# Patient Record
Sex: MALE | Race: WHITE | Employment: OTHER | URBAN - NONMETROPOLITAN AREA
[De-identification: names, ages, dates, MRNs, and addresses within clinical notes are randomized per-mention and may not be internally consistent; named-entity substitution may affect disease eponyms.]

---

## 2019-07-10 ENCOUNTER — APPOINTMENT (OUTPATIENT)
Dept: ULTRASOUND IMAGING | Age: 79
End: 2019-07-10
Payer: MEDICARE

## 2019-07-10 ENCOUNTER — HOSPITAL ENCOUNTER (EMERGENCY)
Age: 79
Discharge: HOME | End: 2019-07-10
Attending: EMERGENCY MEDICINE
Payer: MEDICARE

## 2019-07-10 VITALS
SYSTOLIC BLOOD PRESSURE: 159 MMHG | TEMPERATURE: 97.6 F | RESPIRATION RATE: 16 BRPM | OXYGEN SATURATION: 98 % | BODY MASS INDEX: 23.52 KG/M2 | DIASTOLIC BLOOD PRESSURE: 81 MMHG | HEART RATE: 70 BPM | HEIGHT: 71 IN | WEIGHT: 168 LBS

## 2019-07-10 DIAGNOSIS — M79.89 LEG SWELLING: Primary | ICD-10-CM

## 2019-07-10 PROCEDURE — 93971 EXTREMITY STUDY: CPT

## 2019-07-10 PROCEDURE — 99283 EMERGENCY DEPT VISIT LOW MDM: CPT

## 2019-07-10 RX ORDER — HYDROCHLOROTHIAZIDE 25 MG/1
12.5 TABLET ORAL EVERY MORNING
Qty: 10 TABLET | Refills: 0 | Status: SHIPPED | OUTPATIENT
Start: 2019-07-10

## 2019-07-10 ASSESSMENT — PAIN DESCRIPTION - FREQUENCY: FREQUENCY: CONTINUOUS

## 2019-07-10 ASSESSMENT — PAIN SCALES - GENERAL: PAINLEVEL_OUTOF10: 1

## 2019-07-10 ASSESSMENT — PAIN DESCRIPTION - LOCATION: LOCATION: LEG

## 2019-07-10 ASSESSMENT — PAIN DESCRIPTION - PAIN TYPE: TYPE: ACUTE PAIN

## 2019-07-10 ASSESSMENT — PAIN DESCRIPTION - ORIENTATION: ORIENTATION: RIGHT;LOWER

## 2019-07-10 ASSESSMENT — PAIN DESCRIPTION - DESCRIPTORS: DESCRIPTORS: PRESSURE

## 2019-07-10 NOTE — ED NOTES
Compression stocking applied. Discharge instructions and Rx given. Voices understanding.       Chasity Black RN  07/10/19 2473

## 2019-07-10 NOTE — ED PROVIDER NOTES
Emergency Department Encounter  Location: [facility] [address] [place] [address]    Patient: Jasmina Garcia  MRN: [id number]  : 1940  Date of evaluation: 7/10/2019  ED Provider: Robert Palafox DO, FACEP    Chief Complaint:    Leg Swelling (right lower leg swelling x 2 days. Sent from Urgent Care. pt states (I'm worried about polio. You see it on tv alot). )    [place]:  Jasmina Garcia is a 78 y.o. male that presents to the emergency department from the urgent care with complaints of swelling in his right leg for the past 2 days. The patient states that his leg was much more swollen yesterday. He states he try to go to his doctor today. His doctor's office sent him to the urgent care. The urgent care sent him to the emergency department. Patient states he has had some cramping in his foot and in his lower extremity that has occurred during the night. He is denying any pain at this time. He states occasionally he will have pain on the medial surface of his right lower leg. He has no pain above his knee. He is complaining of slight pain in the anterior portion of his knee as well. He has had no specific injury. He denies any previous history of leg swelling. He is concerned about a return of polio. He states he had polio when he was 3years old. He denies any numbness or tingling or any weakness of the extremity. ROS:  At least 4 systems reviewed and otherwise acutely negative except as in the [address] Past Medical History:   Diagnosis Date    Diverticulitis     Hyperlipidemia     Pneumothorax, right     Rectal bleeding     Seasonal allergic reaction      Past Surgical History:   Procedure Laterality Date    CHEST TUBE INSERTION      TONSILLECTOMY      as child     Family History   Problem Relation Age of Onset    Heart Disease Mother     Cancer Mother     Diabetes Father     Diabetes Brother     Cancer Sister      Social History     Socioeconomic History    Marital status:

## 2021-12-16 ENCOUNTER — HOSPITAL ENCOUNTER (EMERGENCY)
Age: 81
Discharge: ANOTHER HOSPITAL | End: 2021-12-17
Attending: EMERGENCY MEDICINE
Payer: MEDICARE

## 2021-12-16 ENCOUNTER — APPOINTMENT (OUTPATIENT)
Dept: GENERAL RADIOLOGY | Age: 81
End: 2021-12-16
Payer: MEDICARE

## 2021-12-16 ENCOUNTER — APPOINTMENT (OUTPATIENT)
Dept: CT IMAGING | Age: 81
End: 2021-12-16
Payer: MEDICARE

## 2021-12-16 DIAGNOSIS — K57.30 COLON, DIVERTICULOSIS: ICD-10-CM

## 2021-12-16 DIAGNOSIS — K80.00 ACUTE CHOLECYSTITIS DUE TO BILIARY CALCULUS: Primary | ICD-10-CM

## 2021-12-16 DIAGNOSIS — R79.89 ELEVATED LFTS: ICD-10-CM

## 2021-12-16 LAB
ALBUMIN SERPL-MCNC: 4.6 GM/DL (ref 3.4–5)
ALP BLD-CCNC: 126 IU/L (ref 40–129)
ALT SERPL-CCNC: 344 U/L (ref 10–40)
ANION GAP SERPL CALCULATED.3IONS-SCNC: 15 MMOL/L (ref 4–16)
AST SERPL-CCNC: 452 IU/L (ref 15–37)
BASOPHILS ABSOLUTE: 0 K/CU MM
BASOPHILS RELATIVE PERCENT: 0.1 % (ref 0–1)
BILIRUB SERPL-MCNC: 2.4 MG/DL (ref 0–1)
BUN BLDV-MCNC: 18 MG/DL (ref 6–23)
CALCIUM SERPL-MCNC: 9.4 MG/DL (ref 8.3–10.6)
CHLORIDE BLD-SCNC: 102 MMOL/L (ref 99–110)
CO2: 21 MMOL/L (ref 21–32)
CREAT SERPL-MCNC: 1.1 MG/DL (ref 0.9–1.3)
DIFFERENTIAL TYPE: ABNORMAL
EOSINOPHILS ABSOLUTE: 0 K/CU MM
EOSINOPHILS RELATIVE PERCENT: 0 % (ref 0–3)
GFR AFRICAN AMERICAN: >60 ML/MIN/1.73M2
GFR NON-AFRICAN AMERICAN: >60 ML/MIN/1.73M2
GLUCOSE BLD-MCNC: 200 MG/DL (ref 70–99)
HCT VFR BLD CALC: 44.4 % (ref 42–52)
HEMOGLOBIN: 14.6 GM/DL (ref 13.5–18)
IMMATURE NEUTROPHIL %: 0.9 % (ref 0–0.43)
LYMPHOCYTES ABSOLUTE: 0.3 K/CU MM
LYMPHOCYTES RELATIVE PERCENT: 3.3 % (ref 24–44)
MCH RBC QN AUTO: 32.4 PG (ref 27–31)
MCHC RBC AUTO-ENTMCNC: 32.9 % (ref 32–36)
MCV RBC AUTO: 98.4 FL (ref 78–100)
MONOCYTES ABSOLUTE: 0.6 K/CU MM
MONOCYTES RELATIVE PERCENT: 6.5 % (ref 0–4)
PDW BLD-RTO: 12.4 % (ref 11.7–14.9)
PLATELET # BLD: 192 K/CU MM (ref 140–440)
PMV BLD AUTO: 10.9 FL (ref 7.5–11.1)
POTASSIUM SERPL-SCNC: 4.3 MMOL/L (ref 3.5–5.1)
RBC # BLD: 4.51 M/CU MM (ref 4.6–6.2)
SEGMENTED NEUTROPHILS ABSOLUTE COUNT: 8.5 K/CU MM
SEGMENTED NEUTROPHILS RELATIVE PERCENT: 89.2 % (ref 36–66)
SODIUM BLD-SCNC: 138 MMOL/L (ref 135–145)
TOTAL IMMATURE NEUTOROPHIL: 0.09 K/CU MM
TOTAL PROTEIN: 8 GM/DL (ref 6.4–8.2)
TROPONIN T: <0.01 NG/ML
WBC # BLD: 9.6 K/CU MM (ref 4–10.5)

## 2021-12-16 PROCEDURE — 99285 EMERGENCY DEPT VISIT HI MDM: CPT

## 2021-12-16 PROCEDURE — 83690 ASSAY OF LIPASE: CPT

## 2021-12-16 PROCEDURE — 71045 X-RAY EXAM CHEST 1 VIEW: CPT

## 2021-12-16 PROCEDURE — 84484 ASSAY OF TROPONIN QUANT: CPT

## 2021-12-16 PROCEDURE — 74177 CT ABD & PELVIS W/CONTRAST: CPT

## 2021-12-16 PROCEDURE — 96365 THER/PROPH/DIAG IV INF INIT: CPT

## 2021-12-16 PROCEDURE — 6360000004 HC RX CONTRAST MEDICATION: Performed by: EMERGENCY MEDICINE

## 2021-12-16 PROCEDURE — 85025 COMPLETE CBC W/AUTO DIFF WBC: CPT

## 2021-12-16 PROCEDURE — 6370000000 HC RX 637 (ALT 250 FOR IP): Performed by: EMERGENCY MEDICINE

## 2021-12-16 PROCEDURE — 96375 TX/PRO/DX INJ NEW DRUG ADDON: CPT

## 2021-12-16 PROCEDURE — 80053 COMPREHEN METABOLIC PANEL: CPT

## 2021-12-16 PROCEDURE — 80074 ACUTE HEPATITIS PANEL: CPT

## 2021-12-16 RX ORDER — SUCRALFATE 1 G/1
1 TABLET ORAL ONCE
Status: COMPLETED | OUTPATIENT
Start: 2021-12-16 | End: 2021-12-16

## 2021-12-16 RX ORDER — FAMOTIDINE 20 MG/1
20 TABLET, FILM COATED ORAL ONCE
Status: COMPLETED | OUTPATIENT
Start: 2021-12-16 | End: 2021-12-16

## 2021-12-16 RX ORDER — ASPIRIN 81 MG/1
324 TABLET, CHEWABLE ORAL ONCE
Status: COMPLETED | OUTPATIENT
Start: 2021-12-16 | End: 2021-12-16

## 2021-12-16 RX ADMIN — ASPIRIN 81 MG CHEWABLE TABLET 324 MG: 81 TABLET CHEWABLE at 22:44

## 2021-12-16 RX ADMIN — SUCRALFATE 1 G: 1 TABLET ORAL at 22:44

## 2021-12-16 RX ADMIN — IOPAMIDOL 100 ML: 755 INJECTION, SOLUTION INTRAVENOUS at 23:05

## 2021-12-16 RX ADMIN — FAMOTIDINE 20 MG: 20 TABLET, FILM COATED ORAL at 22:44

## 2021-12-17 ENCOUNTER — HOSPITAL ENCOUNTER (INPATIENT)
Age: 81
LOS: 1 days | Discharge: HOME | DRG: 419 | End: 2021-12-18
Attending: STUDENT IN AN ORGANIZED HEALTH CARE EDUCATION/TRAINING PROGRAM | Admitting: INTERNAL MEDICINE
Payer: MEDICARE

## 2021-12-17 ENCOUNTER — ANESTHESIA EVENT (OUTPATIENT)
Dept: OPERATING ROOM | Age: 81
DRG: 419 | End: 2021-12-17
Payer: MEDICARE

## 2021-12-17 ENCOUNTER — APPOINTMENT (OUTPATIENT)
Dept: ULTRASOUND IMAGING | Age: 81
End: 2021-12-17
Payer: MEDICARE

## 2021-12-17 VITALS
HEART RATE: 59 BPM | WEIGHT: 168 LBS | SYSTOLIC BLOOD PRESSURE: 143 MMHG | RESPIRATION RATE: 11 BRPM | DIASTOLIC BLOOD PRESSURE: 72 MMHG | HEIGHT: 71 IN | BODY MASS INDEX: 23.52 KG/M2 | OXYGEN SATURATION: 99 % | TEMPERATURE: 98.2 F

## 2021-12-17 DIAGNOSIS — K81.0 ACUTE CHOLECYSTITIS: Primary | ICD-10-CM

## 2021-12-17 PROBLEM — K80.20 CHOLELITHIASIS: Status: ACTIVE | Noted: 2021-12-17

## 2021-12-17 PROBLEM — E78.5 HLD (HYPERLIPIDEMIA): Status: ACTIVE | Noted: 2021-12-17

## 2021-12-17 PROBLEM — R10.13 EPIGASTRIC ABDOMINAL PAIN: Status: ACTIVE | Noted: 2021-12-17

## 2021-12-17 PROBLEM — I10 ESSENTIAL HYPERTENSION: Status: ACTIVE | Noted: 2021-12-17

## 2021-12-17 LAB
ALBUMIN SERPL-MCNC: 3.5 GM/DL (ref 3.4–5)
ALP BLD-CCNC: 110 IU/L (ref 40–129)
ALT SERPL-CCNC: 357 U/L (ref 10–40)
ANION GAP SERPL CALCULATED.3IONS-SCNC: 6 MMOL/L (ref 4–16)
AST SERPL-CCNC: 328 IU/L (ref 15–37)
BASOPHILS ABSOLUTE: 0 K/CU MM
BASOPHILS RELATIVE PERCENT: 0.3 % (ref 0–1)
BILIRUB SERPL-MCNC: 2.7 MG/DL (ref 0–1)
BUN BLDV-MCNC: 13 MG/DL (ref 6–23)
CALCIUM SERPL-MCNC: 8.6 MG/DL (ref 8.3–10.6)
CHLORIDE BLD-SCNC: 104 MMOL/L (ref 99–110)
CO2: 28 MMOL/L (ref 21–32)
CREAT SERPL-MCNC: 1.1 MG/DL (ref 0.9–1.3)
DIFFERENTIAL TYPE: ABNORMAL
EKG ATRIAL RATE: 98 BPM
EKG DIAGNOSIS: NORMAL
EKG P AXIS: 70 DEGREES
EKG P-R INTERVAL: 158 MS
EKG Q-T INTERVAL: 368 MS
EKG QRS DURATION: 140 MS
EKG QTC CALCULATION (BAZETT): 469 MS
EKG R AXIS: -40 DEGREES
EKG T AXIS: 14 DEGREES
EKG VENTRICULAR RATE: 98 BPM
EOSINOPHILS ABSOLUTE: 0 K/CU MM
EOSINOPHILS RELATIVE PERCENT: 0.3 % (ref 0–3)
ESTIMATED AVERAGE GLUCOSE: 117 MG/DL
GFR AFRICAN AMERICAN: >60 ML/MIN/1.73M2
GFR NON-AFRICAN AMERICAN: >60 ML/MIN/1.73M2
GLUCOSE BLD-MCNC: 115 MG/DL (ref 70–99)
HAV IGM SER IA-ACNC: NON REACTIVE
HBA1C MFR BLD: 5.7 % (ref 4.2–6.3)
HCT VFR BLD CALC: 39.8 % (ref 42–52)
HEMOGLOBIN: 13.2 GM/DL (ref 13.5–18)
HEPATITIS B CORE IGM ANTIBODY: NON REACTIVE
HEPATITIS B SURFACE ANTIGEN: NON REACTIVE
HEPATITIS C ANTIBODY: NON REACTIVE
IMMATURE NEUTROPHIL %: 0.3 % (ref 0–0.43)
INR BLD: 1.09 INDEX
LIPASE: 17 IU/L (ref 13–60)
LYMPHOCYTES ABSOLUTE: 0.7 K/CU MM
LYMPHOCYTES RELATIVE PERCENT: 10.6 % (ref 24–44)
MCH RBC QN AUTO: 32.4 PG (ref 27–31)
MCHC RBC AUTO-ENTMCNC: 33.2 % (ref 32–36)
MCV RBC AUTO: 97.5 FL (ref 78–100)
MONOCYTES ABSOLUTE: 0.9 K/CU MM
MONOCYTES RELATIVE PERCENT: 12.4 % (ref 0–4)
PDW BLD-RTO: 12.5 % (ref 11.7–14.9)
PLATELET # BLD: 165 K/CU MM (ref 140–440)
PMV BLD AUTO: 10.2 FL (ref 7.5–11.1)
POTASSIUM SERPL-SCNC: 3.8 MMOL/L (ref 3.5–5.1)
PROTHROMBIN TIME: 13.5 SECONDS (ref 11.7–14.5)
RBC # BLD: 4.08 M/CU MM (ref 4.6–6.2)
SARS-COV-2, NAAT: NOT DETECTED
SEGMENTED NEUTROPHILS ABSOLUTE COUNT: 5.2 K/CU MM
SEGMENTED NEUTROPHILS RELATIVE PERCENT: 76.1 % (ref 36–66)
SODIUM BLD-SCNC: 138 MMOL/L (ref 135–145)
SOURCE: NORMAL
TOTAL IMMATURE NEUTOROPHIL: 0.02 K/CU MM
TOTAL PROTEIN: 6.3 GM/DL (ref 6.4–8.2)
WBC # BLD: 6.9 K/CU MM (ref 4–10.5)

## 2021-12-17 PROCEDURE — 6360000002 HC RX W HCPCS: Performed by: PHYSICIAN ASSISTANT

## 2021-12-17 PROCEDURE — 96375 TX/PRO/DX INJ NEW DRUG ADDON: CPT

## 2021-12-17 PROCEDURE — 99222 1ST HOSP IP/OBS MODERATE 55: CPT | Performed by: SURGERY

## 2021-12-17 PROCEDURE — 83036 HEMOGLOBIN GLYCOSYLATED A1C: CPT

## 2021-12-17 PROCEDURE — C9113 INJ PANTOPRAZOLE SODIUM, VIA: HCPCS | Performed by: PHYSICIAN ASSISTANT

## 2021-12-17 PROCEDURE — 96365 THER/PROPH/DIAG IV INF INIT: CPT

## 2021-12-17 PROCEDURE — 85610 PROTHROMBIN TIME: CPT

## 2021-12-17 PROCEDURE — 85025 COMPLETE CBC W/AUTO DIFF WBC: CPT

## 2021-12-17 PROCEDURE — 87635 SARS-COV-2 COVID-19 AMP PRB: CPT

## 2021-12-17 PROCEDURE — 96372 THER/PROPH/DIAG INJ SC/IM: CPT

## 2021-12-17 PROCEDURE — 1200000000 HC SEMI PRIVATE

## 2021-12-17 PROCEDURE — 76705 ECHO EXAM OF ABDOMEN: CPT

## 2021-12-17 PROCEDURE — 2580000003 HC RX 258: Performed by: INTERNAL MEDICINE

## 2021-12-17 PROCEDURE — 2580000003 HC RX 258: Performed by: EMERGENCY MEDICINE

## 2021-12-17 PROCEDURE — 6360000002 HC RX W HCPCS: Performed by: INTERNAL MEDICINE

## 2021-12-17 PROCEDURE — 96366 THER/PROPH/DIAG IV INF ADDON: CPT

## 2021-12-17 PROCEDURE — 6370000000 HC RX 637 (ALT 250 FOR IP): Performed by: INTERNAL MEDICINE

## 2021-12-17 PROCEDURE — 6360000002 HC RX W HCPCS: Performed by: EMERGENCY MEDICINE

## 2021-12-17 PROCEDURE — 80053 COMPREHEN METABOLIC PANEL: CPT

## 2021-12-17 RX ORDER — ASPIRIN 81 MG/1
81 TABLET ORAL DAILY
Status: DISCONTINUED | OUTPATIENT
Start: 2021-12-17 | End: 2021-12-18 | Stop reason: HOSPADM

## 2021-12-17 RX ORDER — ATORVASTATIN CALCIUM 10 MG/1
10 TABLET, FILM COATED ORAL NIGHTLY
Status: DISCONTINUED | OUTPATIENT
Start: 2021-12-17 | End: 2021-12-18 | Stop reason: HOSPADM

## 2021-12-17 RX ORDER — SODIUM CHLORIDE 9 MG/ML
INJECTION, SOLUTION INTRAVENOUS CONTINUOUS
Status: DISCONTINUED | OUTPATIENT
Start: 2021-12-17 | End: 2021-12-18 | Stop reason: HOSPADM

## 2021-12-17 RX ORDER — POLYETHYLENE GLYCOL 3350 17 G/17G
17 POWDER, FOR SOLUTION ORAL DAILY PRN
Status: DISCONTINUED | OUTPATIENT
Start: 2021-12-17 | End: 2021-12-18 | Stop reason: HOSPADM

## 2021-12-17 RX ORDER — SODIUM CHLORIDE 9 MG/ML
INJECTION, SOLUTION INTRAVENOUS CONTINUOUS
Status: DISCONTINUED | OUTPATIENT
Start: 2021-12-17 | End: 2021-12-17 | Stop reason: HOSPADM

## 2021-12-17 RX ORDER — ONDANSETRON 4 MG/1
4 TABLET, ORALLY DISINTEGRATING ORAL EVERY 8 HOURS PRN
Status: DISCONTINUED | OUTPATIENT
Start: 2021-12-17 | End: 2021-12-18 | Stop reason: HOSPADM

## 2021-12-17 RX ORDER — SODIUM CHLORIDE 9 MG/ML
1000 INJECTION, SOLUTION INTRAVENOUS CONTINUOUS
Status: DISCONTINUED | OUTPATIENT
Start: 2021-12-17 | End: 2021-12-17 | Stop reason: HOSPADM

## 2021-12-17 RX ORDER — ACETAMINOPHEN 650 MG/1
650 SUPPOSITORY RECTAL EVERY 6 HOURS PRN
Status: DISCONTINUED | OUTPATIENT
Start: 2021-12-17 | End: 2021-12-18 | Stop reason: HOSPADM

## 2021-12-17 RX ORDER — SODIUM CHLORIDE 0.9 % (FLUSH) 0.9 %
5-40 SYRINGE (ML) INJECTION EVERY 12 HOURS SCHEDULED
Status: DISCONTINUED | OUTPATIENT
Start: 2021-12-17 | End: 2021-12-18 | Stop reason: HOSPADM

## 2021-12-17 RX ORDER — SODIUM CHLORIDE 9 MG/ML
25 INJECTION, SOLUTION INTRAVENOUS PRN
Status: DISCONTINUED | OUTPATIENT
Start: 2021-12-17 | End: 2021-12-18 | Stop reason: HOSPADM

## 2021-12-17 RX ORDER — MORPHINE SULFATE 2 MG/ML
2 INJECTION, SOLUTION INTRAMUSCULAR; INTRAVENOUS EVERY 4 HOURS PRN
Status: DISCONTINUED | OUTPATIENT
Start: 2021-12-17 | End: 2021-12-17 | Stop reason: HOSPADM

## 2021-12-17 RX ORDER — ACETAMINOPHEN 325 MG/1
650 TABLET ORAL EVERY 6 HOURS PRN
Status: DISCONTINUED | OUTPATIENT
Start: 2021-12-17 | End: 2021-12-18 | Stop reason: HOSPADM

## 2021-12-17 RX ORDER — FLUTICASONE PROPIONATE 50 MCG
1 SPRAY, SUSPENSION (ML) NASAL DAILY
Status: DISCONTINUED | OUTPATIENT
Start: 2021-12-17 | End: 2021-12-18 | Stop reason: HOSPADM

## 2021-12-17 RX ORDER — MORPHINE SULFATE 2 MG/ML
2 INJECTION, SOLUTION INTRAMUSCULAR; INTRAVENOUS EVERY 4 HOURS PRN
Status: DISCONTINUED | OUTPATIENT
Start: 2021-12-17 | End: 2021-12-18 | Stop reason: HOSPADM

## 2021-12-17 RX ORDER — SODIUM CHLORIDE 0.9 % (FLUSH) 0.9 %
5-40 SYRINGE (ML) INJECTION PRN
Status: DISCONTINUED | OUTPATIENT
Start: 2021-12-17 | End: 2021-12-18 | Stop reason: HOSPADM

## 2021-12-17 RX ORDER — ONDANSETRON 2 MG/ML
4 INJECTION INTRAMUSCULAR; INTRAVENOUS EVERY 6 HOURS PRN
Status: DISCONTINUED | OUTPATIENT
Start: 2021-12-17 | End: 2021-12-18 | Stop reason: HOSPADM

## 2021-12-17 RX ORDER — PANTOPRAZOLE SODIUM 40 MG/10ML
40 INJECTION, POWDER, LYOPHILIZED, FOR SOLUTION INTRAVENOUS DAILY
Status: DISCONTINUED | OUTPATIENT
Start: 2021-12-17 | End: 2021-12-17 | Stop reason: HOSPADM

## 2021-12-17 RX ORDER — HYDROCODONE BITARTRATE AND ACETAMINOPHEN 5; 325 MG/1; MG/1
1 TABLET ORAL EVERY 6 HOURS PRN
Status: DISCONTINUED | OUTPATIENT
Start: 2021-12-17 | End: 2021-12-18 | Stop reason: HOSPADM

## 2021-12-17 RX ADMIN — SODIUM CHLORIDE 1000 ML: 9 INJECTION, SOLUTION INTRAVENOUS at 01:43

## 2021-12-17 RX ADMIN — ATORVASTATIN CALCIUM 10 MG: 10 TABLET, FILM COATED ORAL at 22:20

## 2021-12-17 RX ADMIN — PANTOPRAZOLE SODIUM 40 MG: 40 INJECTION, POWDER, FOR SOLUTION INTRAVENOUS at 07:55

## 2021-12-17 RX ADMIN — SODIUM CHLORIDE: 9 INJECTION, SOLUTION INTRAVENOUS at 15:22

## 2021-12-17 RX ADMIN — SODIUM CHLORIDE, PRESERVATIVE FREE 10 ML: 5 INJECTION INTRAVENOUS at 22:28

## 2021-12-17 RX ADMIN — ENOXAPARIN SODIUM 40 MG: 100 INJECTION SUBCUTANEOUS at 15:31

## 2021-12-17 RX ADMIN — ASPIRIN 81 MG: 81 TABLET, COATED ORAL at 15:30

## 2021-12-17 RX ADMIN — CEFTRIAXONE SODIUM 1000 MG: 1 INJECTION, POWDER, FOR SOLUTION INTRAMUSCULAR; INTRAVENOUS at 07:10

## 2021-12-17 RX ADMIN — PIPERACILLIN AND TAZOBACTAM 3375 MG: 3; .375 INJECTION, POWDER, LYOPHILIZED, FOR SOLUTION INTRAVENOUS at 15:30

## 2021-12-17 RX ADMIN — PIPERACILLIN AND TAZOBACTAM 3375 MG: 3; .375 INJECTION, POWDER, LYOPHILIZED, FOR SOLUTION INTRAVENOUS at 22:24

## 2021-12-17 NOTE — ED NOTES
Ambulated to BR with assistance. Pt ambulates with a limp and favors his Lt side.       Go Tierney RN  12/17/21 0311

## 2021-12-17 NOTE — PROGRESS NOTES
Pt direct admit from [facility]. Transferred d to bed without incident. Alert and oriented x4. Orientation provided to patient about use of call light, telephone. Teachings provided about safety and infection control. Pt verbalizes understanding. Call light and low bed in position. Skin assessment completed with no skin issues noted.  Skin WDL

## 2021-12-17 NOTE — ED TRIAGE NOTES
At lunch noticed a bump to lower chest upper abd. Was having some burning up into chest. Vomited twice.

## 2021-12-17 NOTE — ED NOTES
No complaints. Watching TV. Wants to go home. Feels better and denies epigastric pain. Two yellow tops drawn for hepatitis screen  From existing IV site after 10 10ml of blood discarded.        Ignacio Vicente RN  12/17/21 2051

## 2021-12-17 NOTE — CONSULTS
Hospital Medicine History & Physical      PCP: Jessica Aviles MD    Date of Admission: 12/16/2021    Date of Service: Pt seen/examined on 12/17/2021    Chief Complaint:    Chief Complaint   Patient presents with    Chest Pain         History Of Present Illness: The patient is a 80 y.o. male with anemia, history of pneumothorax who presented to [facility] ED with complaint of chest pain and abdominal pain. Patient states it started yesterday around 1 to 2:00 in the afternoon. That was more of a gas pain abated to see if it would improve with time. He states that he started having worsening pain and had a few episodes of nonbloody, nonbilious emesis which prompted him to come to the emergency department. He states he had pain to the middle of his chest but no associated shortness of breath. He denies any associated fevers or chills. He denies any prior history of this. Currently reports that his pain is completely resolved. Past Medical History:        Diagnosis Date    Diverticulitis     Hyperlipidemia     Pneumothorax, right     Rectal bleeding     Seasonal allergic reaction        Past Surgical History:        Procedure Laterality Date    CHEST TUBE INSERTION      TONSILLECTOMY      as child       Medications Prior to Admission:    Prior to Admission medications    Medication Sig Start Date End Date Taking? Authorizing Provider   hydrochlorothiazide (HYDRODIURIL) 25 MG tablet Take 0.5 tablets by mouth every morning 7/10/19  Yes Anusha Corrales DO   lovastatin (MEVACOR) 40 MG tablet Take 40 mg by mouth nightly. Yes Historical Provider, MD   fluticasone (FLONASE) 50 MCG/ACT nasal spray 1 spray by Nasal route daily. Yes Historical Provider, MD   aspirin 81 MG tablet Take 81 mg by mouth daily. Yes Historical Provider, MD   Cyanocobalamin (VITAMIN B 12 PO) Take  by mouth daily.    Yes Historical Provider, MD       Allergies:  Sulfa antibiotics    Social History:  The patient currently lives at home    TOBACCO:   reports that he has never smoked. He has never used smokeless tobacco.  ETOH:   reports no history of alcohol use. Family History:   Positive as follows:        Problem Relation Age of Onset    Heart Disease Mother     Cancer Mother     Diabetes Father     Diabetes Brother     Cancer Sister        REVIEW OF SYSTEMS:       Constitutional: Negative for fever   HENT: Negative for sore throat   Eyes: Negative for redness   Respiratory: Negative  for dyspnea, cough   Cardiovascular: + Chest pain   Gastrointestinal: + Abdominal pain + nausea and vomiting  Genitourinary: Negative for hematuria   Musculoskeletal: Negative for arthralgias   Skin: Negative for rash   Neurological: Negative for syncope   Hematological: Negative for adenopathy   Psychiatric/Behavorial: Negative for anxiety    PHYSICAL EXAM:    /71   Pulse 61   Temp 98.6 °F (37 °C) (Oral)   Resp 13   Ht 5' 11\" (1.803 m)   Wt 168 lb (76.2 kg)   SpO2 97%   BMI 23.43 kg/m²     Gen: Elderly male, no distress. Alert. Eyes:  No sclera icterus. No conjunctival injection. ENT: No discharge. Pharynx clear. Neck: Trachea midline. Resp: No accessory muscle use. No crackles. No wheezes. No rhonchi. CV: Regular rate. Regular rhythm. No murmur. No rub. No edema. Capillary Refill: Brisk,< 3 seconds   Peripheral Pulses: +2 palpable, equal bilaterally   GI: Non-tender. Non-distended. Normal bowel sounds. No hernia. Skin: Warm and dry. M/S: No cyanosis. No joint deformity. No clubbing. Neuro: Awake. Grossly nonfocal    Psych: Oriented x 3. No anxiety or agitation.      CBC:   Recent Labs     12/16/21 2130   WBC 9.6   HGB 14.6   HCT 44.4   MCV 98.4        BMP:   Recent Labs     12/16/21 2130      K 4.3      CO2 21   BUN 18   CREATININE 1.1     LIVER PROFILE:   Recent Labs     12/16/21 2130 12/16/21  2245   *  --    *  --    LIPASE  --  17   BILITOT 2.4*  --    ALKPHOS 126  --

## 2021-12-17 NOTE — ED NOTES
Resting in bed quietly. Lights are off. Has not offered any complaints or needs.       Alpesh Mckeon RN  12/17/21 9709

## 2021-12-17 NOTE — CONSULTS
Department of General Surgery   Surgical Service Dr. Shazia Nicolas   Consult Note    Date of Consult: 12/17/21    Reason for Consult:  cholecystitis  Requesting Physician:  Dr. Nirmal Montenegro:  Abdominal pain, nausea    History Obtained From:  patient, electronic medical record    HISTORY OF PRESENT ILLNESS:    The patient is a 80 y.o. male who presented yesterday to the [place] ER with RUQ abdominal pain, nausea and emesis. Symptoms have since improved. He denies prior episodes. Denies changes in bowel habits. CT and US have been performed suggesting gallstones and emphysematous cholecystitis.     Past Medical History:    Past Medical History:   Diagnosis Date    Diverticulitis     Hyperlipidemia     Pneumothorax, right     Rectal bleeding     Seasonal allergic reaction        Past Surgical History:    Past Surgical History:   Procedure Laterality Date    CHEST TUBE INSERTION      TONSILLECTOMY      as child       Current Medications:   Current Facility-Administered Medications   Medication Dose Route Frequency Provider Last Rate Last Admin    aspirin EC tablet 81 mg  81 mg Oral Daily Awilda Edmond MD   81 mg at 12/17/21 1530    fluticasone (FLONASE) 50 MCG/ACT nasal spray 1 spray  1 spray Nasal Daily Awilda Edmond MD        atorvastatin (LIPITOR) tablet 10 mg  10 mg Oral Nightly Awilda Edmond MD        piperacillin-tazobactam (ZOSYN) 3,375 mg in dextrose 5 % 50 mL IVPB extended infusion (mini-bag)  3,375 mg IntraVENous Q8H Awilda Edmond MD 12.5 mL/hr at 12/17/21 1530 3,375 mg at 12/17/21 1530    0.9 % sodium chloride infusion   IntraVENous Continuous Awilda Edmond MD 75 mL/hr at 12/17/21 1522 New Bag at 12/17/21 1522    morphine (PF) injection 2 mg  2 mg IntraVENous Q4H PRN Awilda Edmond MD        sodium chloride flush 0.9 % injection 5-40 mL  5-40 mL IntraVENous 2 times per day Veronica Hyde MD        sodium chloride flush 0.9 % injection 5-40 mL  5-40 mL IntraVENous PRN Awilda Edmond MD        0.9 % sodium chloride infusion  25 mL IntraVENous PRN Awilda Edmond MD        enoxaparin (LOVENOX) injection 40 mg  40 mg SubCUTAneous Daily Awilda Edmond MD   40 mg at 12/17/21 1531    ondansetron (ZOFRAN-ODT) disintegrating tablet 4 mg  4 mg Oral Q8H PRN Awilda Edmond MD        Or    ondansetron (ZOFRAN) injection 4 mg  4 mg IntraVENous Q6H PRN Awilda Edmond MD        polyethylene glycol (GLYCOLAX) packet 17 g  17 g Oral Daily PRN Awilda Edmond MD        acetaminophen (TYLENOL) tablet 650 mg  650 mg Oral Q6H PRN Awilda Edmond MD        Or    acetaminophen (TYLENOL) suppository 650 mg  650 mg Rectal Q6H PRN Awilda Edmond MD        HYDROcodone-acetaminophen (NORCO) 5-325 MG per tablet 1 tablet  1 tablet Oral Q6H PRN Awilda Edmond MD           Allergies:  Sulfa antibiotics    Social History:   Social History     Socioeconomic History    Marital status:      Spouse name: Not on file    Number of children: Not on file    Years of education: Not on file    Highest education level: Not on file   Occupational History    Not on file   Tobacco Use    Smoking status: Never Smoker    Smokeless tobacco: Never Used   Vaping Use    Vaping Use: Never used   Substance and Sexual Activity    Alcohol use: No    Drug use: No    Sexual activity: Not Currently   Other Topics Concern    Not on file   Social History Narrative    Not on file     Social Determinants of Health     Financial Resource Strain:     Difficulty of Paying Living Expenses: Not on file   Food Insecurity:     Worried About Running Out of Food in the Last Year: Not on file    Zainab of Food in the Last Year: Not on file   Transportation Needs:     Lack of Transportation (Medical): Not on file    Lack of Transportation (Non-Medical):  Not on file   Physical Activity:     Days of Exercise per Week: Not on file    Minutes of Exercise per Session: Not on file   Stress:     Feeling of Stress : Not on file   Social Connections:     Frequency of Communication with Friends and Family: Not on file    Frequency of Social Gatherings with Friends and Family: Not on file    Attends Adventism Services: Not on file    Active Member of Clubs or Organizations: Not on file    Attends Club or Organization Meetings: Not on file    Marital Status: Not on file   Intimate Partner Violence:     Fear of Current or Ex-Partner: Not on file    Emotionally Abused: Not on file    Physically Abused: Not on file    Sexually Abused: Not on file   Housing Stability:     Unable to Pay for Housing in the Last Year: Not on file    Number of [place] in the Last Year: Not on file    Unstable Housing in the Last Year: Not on file       Family History:   Family History   Problem Relation Age of Onset    Heart Disease Mother     Cancer Mother     Diabetes Father     Diabetes Brother     Cancer Sister        REVIEW OFSYSTEMS:    Review of Systems   Constitutional: Negative for chills. Negative for fever. HENT: Negative for congestion. Negative for rhinorrhea. Respiratory: Negative for cough. Negative for shortness of breath. Negative for wheezing. Cardiovascular: Negative for chest pain. Gastrointestinal:  As per HPI  Genitourinary: Negative for difficulty urinating. Neurological: Negative for dizziness, syncope and numbness. Hematological: Does not bruise/bleed easily.        PHYSICAL EXAM:  Vitals:    12/17/21 1245 12/17/21 1317 12/17/21 1532   BP:  (!) 142/73 (!) 145/90   Pulse:  62 68   Resp:  16 19   Temp:  98.7 °F (37.1 °C) 98.2 °F (36.8 °C)   TempSrc:  Oral Oral   SpO2:   98%   Weight: 156 lb (70.8 kg)     Height: 5' 11\" (1.803 m)         Physical Exam  General: awake, alert, in no acute distress  HEENT: mucous membranes moist  Respiratory: normal effort, no wheezes appreciated  CV: appears well perfused, regular rate and rhythm  Abdomen: Soft, non-tender, non-distended. No guarding or rebound tenderness. Skin: warm and dry  Extremities: atraumatic  Neuro: no focal deficits noted  Psych: mood normal        DATA:    Lab Results   Component Value Date    WBC 6.9 12/17/2021    HGB 13.2 (L) 12/17/2021    HCT 39.8 (L) 12/17/2021    MCV 97.5 12/17/2021     12/17/2021     Lab Results   Component Value Date     12/17/2021    K 3.8 12/17/2021     12/17/2021    CO2 28 12/17/2021    BUN 13 12/17/2021    CREATININE 1.1 12/17/2021    GLUCOSE 115 12/17/2021    CALCIUM 8.6 12/17/2021          IMPRESSION:    80 y.o. male with cholecystitis. LFTs elevated. Pain has improved. Patient Active Problem List:     Bright red rectal bleeding     Diverticulosis     Acute cholecystitis     Cholelithiasis     Epigastric abdominal pain     HLD (hyperlipidemia)     Essential hypertension        PLAN:  - recommend laparoscopic cholecystectomy, will plan this tomorrow  - check LFTs in the AM    Risks and benefits of cholecystectomy were discussed.            Electronically signed by Violet Obrien MD on 12/17/2021 at 4:44 PM

## 2021-12-17 NOTE — H&P
History and Physical      Name:  Fay Arnett /Age/Sex: [birth year]  (80 y.o. male)   MRN & CSN:  [id number] & [id number] Admission Date/Time: 2021 12:03 PM   Location:  [address] [place] [address] PCP: Haley Bridges MD       Hospital Day: 1    Assessment and Plan:   Fay Arnett is a 80 y.o.  male  who presents with Acute cholecystitis and choledocholithiasis     Principal Problem:    Acute cholecystitis  Active Problems:    Cholelithiasis    Epigastric abdominal pain    HLD (hyperlipidemia)    Essential hypertension  Resolved Problems:    * No resolved hospital problems. *      N.p.o.  IV hydration with NS  As needed pain medications  General surgery consult in view of cholecystectomy  Continue other chronic home medications  Continue on Zosyn          Diet Diet NPO Exceptions are: Sips of Clear Liquids, Sips of Water with Meds, Ice Chips   DVT Prophylaxis [x] Lovenox, []  Heparin, [] SCDs, [] Ambulation   GI Prophylaxis [x] PPI,  [] H2 Blocker,  [] Carafate,  [] Diet/Tube Feeds   Code Status Full Code   Disposition Patient requires continued admission due to acute cholecystitis   MDM [] Low, [x] Moderate,[]  High  Patient's risk as above due to acute cholecystitis     History of Present Illness:     Chief Complaint: Acute cholecystitis  Fay Arnett is a 80 y.o.  male  who presents with history of hypertension, hyperlipidemia, history of polio who presented to [facility] ER with acute epigastric abdominal pain yesterday with associated nausea and nonbilious vomiting with no hematemesis. Pain has since improved. No fevers or chills. No constipation or diarrhea. CT abdomen with noted for evidence of cholecystitis and so was the ultrasound of abdomen with cholelithiasis but no choledocholithiasis. He was transferred for further evaluation here.   Patient was seen by general surgery with plans for cholecystectomy tomorrow       Ten point ROS reviewed negative, unless as noted above    Objective:   No intake or output data in the 24 hours ending 12/17/21 1445   Vitals:   Vitals:    12/17/21 1317   BP: (!) 142/73   Pulse: 62   Resp: 16   Temp: 98.7 °F (37.1 °C)     Physical Exam:   GEN Awake male, sitting upright in bed in no apparent distress. Appears given age. EYES Pupils are equally round. No scleral erythema, discharge, or conjunctivitis. HENT Mucous membranes are moist. Oral pharynx without exudates, no evidence of thrush. NECK Supple, no apparent thyromegaly or masses. RESP Clear to auscultation, no wheezes, rales or rhonchi. Symmetric chest movement while on room air. CARDIO/VASC S1/S2 auscultated. Regular rate without appreciable murmurs, rubs, or gallops. No JVD or carotid bruits. Peripheral pulses equal bilaterally and palpable. No peripheral edema. GI Abdomen is soft without significant tenderness, masses, or guarding. Bowel sounds are normoactive. Rectal exam deferred.  No costovertebral angle tenderness. Normal appearing external genitalia. Esparza catheter is not present. HEME/LYMPH No palpable cervical lymphadenopathy and no hepatosplenomegaly. No petechiae or ecchymoses. MSK No gross joint deformities. SKIN Normal coloration, warm, dry. NEURO Cranial nerves appear grossly intact, normal speech, no lateralizing weakness. PSYCH Awake, alert, oriented x 4. Affect appropriate. Past Medical History:      Past Medical History:   Diagnosis Date    Diverticulitis     Hyperlipidemia     Pneumothorax, right     Rectal bleeding     Seasonal allergic reaction      PSHX:  has a past surgical history that includes chest tube insertion and Tonsillectomy. Allergies: Allergies   Allergen Reactions    Sulfa Antibiotics Swelling and Rash       FAM HX: family history includes Cancer in his mother and sister; Diabetes in his brother and father; Heart Disease in his mother. Soc HX:   Social History     Socioeconomic History    Marital status:       Spouse name: Not on file    Number of children: Not on file    Years of education: Not on file    Highest education level: Not on file   Occupational History    Not on file   Tobacco Use    Smoking status: Never Smoker    Smokeless tobacco: Never Used   Vaping Use    Vaping Use: Never used   Substance and Sexual Activity    Alcohol use: No    Drug use: No    Sexual activity: Not Currently   Other Topics Concern    Not on file   Social History Narrative    Not on file     Social Determinants of Health     Financial Resource Strain:     Difficulty of Paying Living Expenses: Not on file   Food Insecurity:     Worried About Running Out of Food in the Last Year: Not on file    Zainab of Food in the Last Year: Not on file   Transportation Needs:     Lack of Transportation (Medical): Not on file    Lack of Transportation (Non-Medical):  Not on file   Physical Activity:     Days of Exercise per Week: Not on file    Minutes of Exercise per Session: Not on file   Stress:     Feeling of Stress : Not on file   Social Connections:     Frequency of Communication with Friends and Family: Not on file    Frequency of Social Gatherings with Friends and Family: Not on file    Attends Evangelical Services: Not on file    Active Member of [address] [place] [address] or Organizations: Not on file    Attends Club or Organization Meetings: Not on file    Marital Status: Not on file   Intimate Partner Violence:     Fear of Current or Ex-Partner: Not on file    Emotionally Abused: Not on file    Physically Abused: Not on file    Sexually Abused: Not on file   Housing Stability:     Unable to Pay for Housing in the Last Year: Not on file    Number of [place] in the Last Year: Not on file    Unstable Housing in the Last Year: Not on file       Medications:   Medications:    aspirin  81 mg Oral Daily    fluticasone  1 spray Nasal Daily    atorvastatin  10 mg Oral Nightly    piperacillin-tazobactam  3,375 mg IntraVENous Q8H    sodium chloride flush  5-40 mL IntraVENous 2 times per day    enoxaparin  40 mg SubCUTAneous Daily      Infusions:    sodium chloride      sodium chloride       PRN Meds: morphine, 2 mg, Q4H PRN  sodium chloride flush, 5-40 mL, PRN  sodium chloride, 25 mL, PRN  ondansetron, 4 mg, Q8H PRN   Or  ondansetron, 4 mg, Q6H PRN  polyethylene glycol, 17 g, Daily PRN  acetaminophen, 650 mg, Q6H PRN   Or  acetaminophen, 650 mg, Q6H PRN  HYDROcodone-acetaminophen, 1 tablet, Q6H PRN          Electronically signed by Toma Tsai MD on 12/17/2021 at 2:45 PM

## 2021-12-17 NOTE — ANESTHESIA PRE PROCEDURE
Department of Anesthesiology  Preprocedure Note       Name:  Kirk Luis   Age:  80 y.o.  :  1940                                          MRN:  [id number]         Date:  2021      Surgeon: Nadine Query):  Jeanmarie Mansfield MD    Procedure: Procedure(s):  CHOLECYSTECTOMY LAPAROSCOPIC    Medications prior to admission:   Prior to Admission medications    Medication Sig Start Date End Date Taking? Authorizing Provider   hydrochlorothiazide (HYDRODIURIL) 25 MG tablet Take 0.5 tablets by mouth every morning 7/10/19   Narciso Talley DO   lovastatin (MEVACOR) 40 MG tablet Take 40 mg by mouth nightly. Historical Provider, MD   fluticasone (FLONASE) 50 MCG/ACT nasal spray 1 spray by Nasal route daily. Historical Provider, MD   aspirin 81 MG tablet Take 81 mg by mouth daily. Historical Provider, MD   Cyanocobalamin (VITAMIN B 12 PO) Take  by mouth daily.     Historical Provider, MD       Current medications:    Current Facility-Administered Medications   Medication Dose Route Frequency Provider Last Rate Last Admin    aspirin EC tablet 81 mg  81 mg Oral Daily Awilda Edmond MD        fluticasone (FLONASE) 50 MCG/ACT nasal spray 1 spray  1 spray Nasal Daily Awilda Edmond MD        atorvastatin (LIPITOR) tablet 10 mg  10 mg Oral Nightly Awilda Edmond MD        piperacillin-tazobactam (ZOSYN) 3,375 mg in dextrose 5 % 50 mL IVPB extended infusion (mini-bag)  3,375 mg IntraVENous Q8H Awilda Edmond MD        0.9 % sodium chloride infusion   IntraVENous Continuous Awilda Edmond MD        morphine (PF) injection 2 mg  2 mg IntraVENous Q4H PRN Awilda Edmond MD        sodium chloride flush 0.9 % injection 5-40 mL  5-40 mL IntraVENous 2 times per day Awilda Edmond MD        sodium chloride flush 0.9 % injection 5-40 mL  5-40 mL IntraVENous PRN Awilda Edmond MD        0.9 % sodium chloride infusion  25 mL IntraVENous PRN Toma Tsai MD       [facility] NPO Status:                                                                                 BMI:   Wt Readings from Last 3 Encounters:   12/17/21 156 lb (70.8 kg)   12/16/21 168 lb (76.2 kg)   07/10/19 168 lb (76.2 kg)     Body mass index is 21.76 kg/m². CBC:   Lab Results   Component Value Date    WBC 6.9 12/17/2021    RBC 4.08 12/17/2021    HGB 13.2 12/17/2021    HCT 39.8 12/17/2021    MCV 97.5 12/17/2021    RDW 12.5 12/17/2021     12/17/2021       CMP:   Lab Results   Component Value Date     12/17/2021    K 3.8 12/17/2021     12/17/2021    CO2 28 12/17/2021    BUN 13 12/17/2021    CREATININE 1.1 12/17/2021    GFRAA >60 12/17/2021    LABGLOM >60 12/17/2021    GLUCOSE 115 12/17/2021    PROT 6.3 12/17/2021    CALCIUM 8.6 12/17/2021    BILITOT 2.7 12/17/2021    ALKPHOS 110 12/17/2021     12/17/2021     12/17/2021       POC Tests: No results for input(s): POCGLU, POCNA, POCK, POCCL, POCBUN, POCHEMO, POCHCT in the last 72 hours.     Coags:   Lab Results   Component Value Date    PROTIME 13.5 12/17/2021    INR 1.09 12/17/2021    APTT 28.6 08/10/2013       HCG (If Applicable): No results found for: PREGTESTUR, PREGSERUM, HCG, HCGQUANT     ABGs: No results found for: PHART, PO2ART, MKD6UUG, OFZ0RQT, BEART, Y7GHNEIR     Type & Screen (If Applicable):  No results found for: LABABO, LABRH    Drug/Infectious Status (If Applicable):  Lab Results   Component Value Date    HEPCAB NON REACTIVE 12/16/2021       COVID-19 Screening (If Applicable):   Lab Results   Component Value Date    COVID19 NOT DETECTED 12/17/2021           Anesthesia Evaluation  Patient summary reviewed  Airway: Mallampati: I  TM distance: >3 FB   Neck ROM: full  Mouth opening: > = 3 FB Dental:          Pulmonary:Negative Pulmonary ROS and normal exam                               Cardiovascular:    (+) hypertension:, hyperlipidemia        Rhythm: regular  Rate: normal           Beta Blocker:  Not on Beta Blocker      ROS comment: Normal sinus rhythm   Left axis deviation   Right bundle branch block   Abnormal ECG   No previous ECGs available   Confirmed by Gt Fiore, SAYED ([id number]) on 12/17/2021 9:10:12 AM      Neuro/Psych:   Negative Neuro/Psych ROS              GI/Hepatic/Renal:            ROS comment: ACUTE CHOLECYSTITIS. Endo/Other: Negative Endo/Other ROS                    Abdominal:             Vascular: negative vascular ROS. Other Findings:           Anesthesia Plan      general     ASA 2 - emergent       Induction: intravenous. MIPS: Postoperative opioids intended and Prophylactic antiemetics administered. Anesthetic plan and risks discussed with patient. Use of blood products discussed with patient whom consented to blood products. Plan discussed with CRNA. THIERNO Dash - CRNA   12/17/2021         Pre Anesthesia Assessment complete.  Chart reviewed on 12/17/2021

## 2021-12-17 NOTE — ED NOTES
States today around 1300 he developed pain (gas-like) pain to epigastric area. Vomited x2 this evening and had a normal BM tonight. Now feels better. Now is 3/10. Abd soft. A&Ox3.       David Cortez RN  12/16/21 2120

## 2021-12-17 NOTE — ED PROVIDER NOTES
Emergency [facility] DEPARTMENT    Patient: Dai Hendricks  MRN: [id number]  : 1940  Date of Evaluation: 2021  ED Provider: Sharif Flores MD    Chief Complaint       Chief Complaint   Patient presents with    Chest Pain     LAWRENCE Hendricks is a 80 y.o. male who presents to the emergency department with report of upper abdominal pain. The patient stated pain started with sore between 5:59 PM.  Then it spontaneously resolved. The patient had mild associated nausea with vomiting. There is no fever or diarrhea. The reported chest pain when questioned was actually abdominal pain. ROS:     At least 10 systems reviewed and otherwise acutely negative except as in the [address] Past History     Past Medical History:   Diagnosis Date    Diverticulitis     Hyperlipidemia     Pneumothorax, right     Rectal bleeding     Seasonal allergic reaction      Past Surgical History:   Procedure Laterality Date    CHEST TUBE INSERTION      TONSILLECTOMY      as child     Social History     Socioeconomic History    Marital status:      Spouse name: None    Number of children: None    Years of education: None    Highest education level: None   Occupational History    None   Tobacco Use    Smoking status: Never Smoker    Smokeless tobacco: Never Used   Vaping Use    Vaping Use: Never used   Substance and Sexual Activity    Alcohol use: No    Drug use: No    Sexual activity: Not Currently   Other Topics Concern    None   Social History Narrative    None     Social Determinants of Health     Financial Resource Strain:     Difficulty of Paying Living Expenses: Not on file   Food Insecurity:     Worried About Running Out of Food in the Last Year: Not on file    Zainab of Food in the Last Year: Not on file   Transportation Needs:     Lack of Transportation (Medical): Not on file    Lack of Transportation (Non-Medical):  Not on file   Physical Activity:     Days of Exercise per Week: Not on file    Minutes of Exercise per Session: Not on file   Stress:     Feeling of Stress : Not on file   Social Connections:     Frequency of Communication with Friends and Family: Not on file    Frequency of Social Gatherings with Friends and Family: Not on file    Attends Restorationist Services: Not on file    Active Member of [address] [place] [address] or Organizations: Not on file    Attends Club or Organization Meetings: Not on file    Marital Status: Not on file   Intimate Partner Violence:     Fear of Current or Ex-Partner: Not on file    Emotionally Abused: Not on file    Physically Abused: Not on file    Sexually Abused: Not on file   Housing Stability:     Unable to Pay for Housing in the Last Year: Not on file    Number of [place] in the Last Year: Not on file    Unstable Housing in the Last Year: Not on file       Medications/Allergies     Discharge Medication List as of 12/17/2021  9:33 AM      CONTINUE these medications which have NOT CHANGED    Details   hydrochlorothiazide (HYDRODIURIL) 25 MG tablet Take 0.5 tablets by mouth every morning, Disp-10 tablet, R-0Print      lovastatin (MEVACOR) 40 MG tablet Take 40 mg by mouth nightly. fluticasone (FLONASE) 50 MCG/ACT nasal spray 1 spray by Nasal route daily. aspirin 81 MG tablet Take 81 mg by mouth daily. Cyanocobalamin (VITAMIN B 12 PO) Take  by mouth daily. Allergies   Allergen Reactions    Sulfa Antibiotics Swelling and Rash        Physical Exam       ED Triage Vitals [12/16/21 2045]   BP Temp Temp Source Pulse Resp SpO2 Height Weight   (!) 150/94 98.6 °F (37 °C) Oral 95 20 (!) 18 % 5' 11\" (1.803 m) 168 lb (76.2 kg)     GENERAL APPEARANCE: Awake and alert. Cooperative. No acute distress. No obvious discomfort. HEAD: Normocephalic. Atraumatic. EYES: Sclera anicteric. PERRL. EOMI.  ENT: Tolerates saliva. No trismus. NECK: Supple. Trachea midline.   No audible stridor or carotid bruit.  CARDIO: RRR. Radial pulse 2+. No audible murmur. LUNGS: Respirations unlabored. CTAB and symmetric with fair to good air movement. .  ABDOMEN: Soft. Non-distended. Mild upper abdominal tenderness especially at the epigastric area to right upper quadrant. EXTREMITIES: No acute deformities. No clubbing or cyanosis. Trace edema to bilateral lower legs. SKIN: Warm and dry. No obvious lesions or discolorations. NEUROLOGICAL: No gross facial drooping. Moves all 4 extremities spontaneously. Awake and alert. Speech is clear. No gross motor or sensory deficit. Cranial nerves III through XII gross intact. PSYCHIATRIC: Normal mood.      Diagnostics   Labs:  Results for orders placed or performed during the hospital encounter of 12/16/21   COVID-19, Rapid    Specimen: Nasopharyngeal   Result Value Ref Range    Source THROAT     SARS-CoV-2, NAAT NOT DETECTED NOT DETECTED   CBC Auto Differential   Result Value Ref Range    WBC 9.6 4.0 - 10.5 K/CU MM    RBC 4.51 (L) 4.6 - 6.2 M/CU MM    Hemoglobin 14.6 13.5 - 18.0 GM/DL    Hematocrit 44.4 42 - 52 %    MCV 98.4 78 - 100 FL    MCH 32.4 (H) 27 - 31 PG    MCHC 32.9 32.0 - 36.0 %    RDW 12.4 11.7 - 14.9 %    Platelets 103 699 - 832 K/CU MM    MPV 10.9 7.5 - 11.1 FL    Differential Type AUTOMATED DIFFERENTIAL     Segs Relative 89.2 (H) 36 - 66 %    Lymphocytes % 3.3 (L) 24 - 44 %    Monocytes % 6.5 (H) 0 - 4 %    Eosinophils % 0.0 0 - 3 %    Basophils % 0.1 0 - 1 %    Segs Absolute 8.5 K/CU MM    Lymphocytes Absolute 0.3 K/CU MM    Monocytes Absolute 0.6 K/CU MM    Eosinophils Absolute 0.0 K/CU MM    Basophils Absolute 0.0 K/CU MM    Immature Neutrophil % 0.9 (H) 0 - 0.43 %    Total Immature Neutrophil 0.09 K/CU MM   Comprehensive Metabolic Panel w/ Reflex to MG   Result Value Ref Range    Sodium 138 135 - 145 MMOL/L    Potassium 4.3 3.5 - 5.1 MMOL/L    Chloride 102 99 - 110 mMol/L    CO2 21 21 - 32 MMOL/L    BUN 18 6 - 23 MG/DL    CREATININE 1.1 0.9 - 1.3 MG/DL Glucose 200 (H) 70 - 99 MG/DL    Calcium 9.4 8.3 - 10.6 MG/DL    Albumin 4.6 3.4 - 5.0 GM/DL    Total Protein 8.0 6.4 - 8.2 GM/DL    Total Bilirubin 2.4 (H) 0.0 - 1.0 MG/DL     (H) 10 - 40 U/L     (H) 15 - 37 IU/L    Alkaline Phosphatase 126 40 - 129 IU/L    GFR Non-African American >60 >60 mL/min/1.73m2    GFR African American >60 >60 mL/min/1.73m2    Anion Gap 15 4 - 16   Troponin   Result Value Ref Range    Troponin T <0.010 <0.01 NG/ML   Hepatitis Panel, Acute   Result Value Ref Range    Hepatitis B Surface Ag NON REACTIVE NON REACTIVE    Hep A IgM NON REACTIVE NON REACTIVE    Hep B Core Ab, IgM NON REACTIVE NON REACTIVE    Hepatitis C Ab NON REACTIVE NON REACTIVE   Lipase   Result Value Ref Range    Lipase 17 13 - 60 IU/L   CBC Auto Differential   Result Value Ref Range    WBC 6.9 4.0 - 10.5 K/CU MM    RBC 4.08 (L) 4.6 - 6.2 M/CU MM    Hemoglobin 13.2 (L) 13.5 - 18.0 GM/DL    Hematocrit 39.8 (L) 42 - 52 %    MCV 97.5 78 - 100 FL    MCH 32.4 (H) 27 - 31 PG    MCHC 33.2 32.0 - 36.0 %    RDW 12.5 11.7 - 14.9 %    Platelets 680 840 - 090 K/CU MM    MPV 10.2 7.5 - 11.1 FL    Differential Type AUTOMATED DIFFERENTIAL     Segs Relative 76.1 (H) 36 - 66 %    Lymphocytes % 10.6 (L) 24 - 44 %    Monocytes % 12.4 (H) 0 - 4 %    Eosinophils % 0.3 0 - 3 %    Basophils % 0.3 0 - 1 %    Segs Absolute 5.2 K/CU MM    Lymphocytes Absolute 0.7 K/CU MM    Monocytes Absolute 0.9 K/CU MM    Eosinophils Absolute 0.0 K/CU MM    Basophils Absolute 0.0 K/CU MM    Immature Neutrophil % 0.3 0 - 0.43 %    Total Immature Neutrophil 0.02 K/CU MM   Comprehensive Metabolic Panel w/ Reflex to MG   Result Value Ref Range    Sodium 138 135 - 145 MMOL/L    Potassium 3.8 3.5 - 5.1 MMOL/L    Chloride 104 99 - 110 mMol/L    CO2 28 21 - 32 MMOL/L    BUN 13 6 - 23 MG/DL    CREATININE 1.1 0.9 - 1.3 MG/DL    Glucose 115 (H) 70 - 99 MG/DL    Calcium 8.6 8.3 - 10.6 MG/DL    Albumin 3.5 3.4 - 5.0 GM/DL    Total Protein 6.3 (L) 6.4 - 8.2 GM/DL Total Bilirubin 2.7 (H) 0.0 - 1.0 MG/DL     (H) 10 - 40 U/L     (H) 15 - 37 IU/L    Alkaline Phosphatase 110 40 - 129 IU/L    GFR Non-African American >60 >60 mL/min/1.73m2    GFR African American >60 >60 mL/min/1.73m2    Anion Gap 6 4 - 16   Protime-INR   Result Value Ref Range    Protime 13.5 11.7 - 14.5 SECONDS    INR 1.09 INDEX   Hemoglobin A1c   Result Value Ref Range    Hemoglobin A1C 5.7 4.2 - 6.3 %    eAG 117 mg/dL   EKG 12 Lead   Result Value Ref Range    Ventricular Rate 98 BPM    Atrial Rate 98 BPM    P-R Interval 158 ms    QRS Duration 140 ms    Q-T Interval 368 ms    QTc Calculation (Bazett) 469 ms    P Axis 70 degrees    R Axis -40 degrees    T Axis 14 degrees    Diagnosis       Normal sinus rhythm  Left axis deviation  Right bundle branch block  Abnormal ECG  No previous ECGs available  Confirmed by ROSIE Strickland ([id number]) on 12/17/2021 9:10:12 AM       Radiographs:  No results found. Procedures/EKG:   EKG Interpretation    Interpreted by emergency department physician    Rhythm: normal sinus   Rate: 98 bpm  Axis: left  Ectopy: none  Conduction: right bundle branch block (complete)  ST Segments: depression in  lateral leads  T Waves: non specific changes and inversion in  v1 and aVr  Q Waves: nonspecific    Clinical Impression: Sinus rhythm with right bundle branch block. There is no available comparison twelve-lead EKG at time of dictation. Jose Cheema MD      ED Course and MDM   In brief, Emerson Burgos is a 80 y.o. male who presented to the emergency department with report of upper abdominal pain. The patient had CT scan of abdomen pelvis demonstrated cholelithiasis findings concerning for acute cholecystitis. Patient also had colonic diverticulosis. The patient LFTs were elevated with a total bilirubin of 2.7. The patient was discussed with Dr. Cristofer Isaacs who agreed to follow the patient in consultation. The patient will remain n.p.o.   The patient started IV fluid hydration. Patient was discussed with the hospitalist, Dr. Zoë Carrasco, who agreed to have the patient in service. At time of disposition patient stable no acute distress. ED Medication Orders (From admission, onward)    Start Ordered     Status Ordering Provider    12/17/21 0400 12/17/21 0356  cefTRIAXone (ROCEPHIN) 1000 mg IVPB in 50 mL D5W minibag  ONCE        Question:  Antimicrobial Indications  Answer:  Intra-Abdominal Infection    Last MAR action: Stopped - by Tameka Xiong on 12/17/21 at 0755 [facility]    12/16/21 2245 12/16/21 2234  famotidine (PEPCID) tablet 20 mg  ONCE         Last MAR action: Given - by Mane ALMODOVAR on 12/16/21 at 2244 [facility]    12/16/21 2245 12/16/21 2234  sucralfate (CARAFATE) tablet 1 g  ONCE         Last MAR action: Given - by Mane ALMODOVAR on 12/16/21 at 2244 [facility]    12/16/21 2244 12/16/21 2245  iopamidol (ISOVUE-370) 76 % injection 100 mL  IMG ONCE PRN         Last MAR action: Given - by Tuan Keating on 12/16/21 at 2305 [facility]    12/16/21 2130 12/16/21 2127  aspirin chewable tablet 324 mg  ONCE         Last MAR action: Given - by Norberto Sandoval on 12/16/21 at 2244 [facility]          Final Impression      1. Acute cholecystitis due to biliary calculus    2. Elevated LFTs    3.  Colon, diverticulosis      DISPOSITION: Transfer to [facility]     (Please note that portions of this note may have been completed with a voice recognition program. Efforts were made to edit the dictations but occasionally words are mis-transcribed.)    Rajesh Mayer MD  [address], [place]  12/19/21 2082

## 2021-12-17 NOTE — ED NOTES
Sleeping with easy respirations. No complaints. No change in condition.       Ignacio Vicente RN  12/17/21 5747

## 2021-12-18 ENCOUNTER — ANESTHESIA (OUTPATIENT)
Dept: OPERATING ROOM | Age: 81
DRG: 419 | End: 2021-12-18
Payer: MEDICARE

## 2021-12-18 VITALS
SYSTOLIC BLOOD PRESSURE: 123 MMHG | OXYGEN SATURATION: 100 % | TEMPERATURE: 97.3 F | DIASTOLIC BLOOD PRESSURE: 78 MMHG | RESPIRATION RATE: 10 BRPM

## 2021-12-18 VITALS
OXYGEN SATURATION: 95 % | TEMPERATURE: 97.5 F | HEART RATE: 58 BPM | DIASTOLIC BLOOD PRESSURE: 74 MMHG | SYSTOLIC BLOOD PRESSURE: 141 MMHG | HEIGHT: 71 IN | WEIGHT: 156 LBS | RESPIRATION RATE: 15 BRPM | BODY MASS INDEX: 21.84 KG/M2

## 2021-12-18 LAB
ALBUMIN SERPL-MCNC: 3.7 GM/DL (ref 3.4–5)
ALP BLD-CCNC: 114 IU/L (ref 40–129)
ALT SERPL-CCNC: 239 U/L (ref 10–40)
ANION GAP SERPL CALCULATED.3IONS-SCNC: 7 MMOL/L (ref 4–16)
AST SERPL-CCNC: 152 IU/L (ref 15–37)
BASOPHILS ABSOLUTE: 0 K/CU MM
BASOPHILS RELATIVE PERCENT: 0.5 % (ref 0–1)
BILIRUB SERPL-MCNC: 0.9 MG/DL (ref 0–1)
BUN BLDV-MCNC: 16 MG/DL (ref 6–23)
CALCIUM SERPL-MCNC: 8.6 MG/DL (ref 8.3–10.6)
CHLORIDE BLD-SCNC: 105 MMOL/L (ref 99–110)
CO2: 24 MMOL/L (ref 21–32)
CREAT SERPL-MCNC: 1.2 MG/DL (ref 0.9–1.3)
DIFFERENTIAL TYPE: ABNORMAL
EOSINOPHILS ABSOLUTE: 0.1 K/CU MM
EOSINOPHILS RELATIVE PERCENT: 2.3 % (ref 0–3)
GFR AFRICAN AMERICAN: >60 ML/MIN/1.73M2
GFR NON-AFRICAN AMERICAN: 58 ML/MIN/1.73M2
GLUCOSE BLD-MCNC: 97 MG/DL (ref 70–99)
HCT VFR BLD CALC: 40.1 % (ref 42–52)
HEMOGLOBIN: 12.9 GM/DL (ref 13.5–18)
IMMATURE NEUTROPHIL %: 0.3 % (ref 0–0.43)
LYMPHOCYTES ABSOLUTE: 0.6 K/CU MM
LYMPHOCYTES RELATIVE PERCENT: 10.6 % (ref 24–44)
MCH RBC QN AUTO: 32.5 PG (ref 27–31)
MCHC RBC AUTO-ENTMCNC: 32.2 % (ref 32–36)
MCV RBC AUTO: 101 FL (ref 78–100)
MONOCYTES ABSOLUTE: 0.7 K/CU MM
MONOCYTES RELATIVE PERCENT: 12.3 % (ref 0–4)
NUCLEATED RBC %: 0 %
PDW BLD-RTO: 12.7 % (ref 11.7–14.9)
PLATELET # BLD: 168 K/CU MM (ref 140–440)
PMV BLD AUTO: 11.2 FL (ref 7.5–11.1)
POTASSIUM SERPL-SCNC: 4.3 MMOL/L (ref 3.5–5.1)
RBC # BLD: 3.97 M/CU MM (ref 4.6–6.2)
SEGMENTED NEUTROPHILS ABSOLUTE COUNT: 4.3 K/CU MM
SEGMENTED NEUTROPHILS RELATIVE PERCENT: 74 % (ref 36–66)
SODIUM BLD-SCNC: 136 MMOL/L (ref 135–145)
TOTAL IMMATURE NEUTOROPHIL: 0.02 K/CU MM
TOTAL NUCLEATED RBC: 0 K/CU MM
TOTAL PROTEIN: 5.8 GM/DL (ref 6.4–8.2)
WBC # BLD: 5.8 K/CU MM (ref 4–10.5)

## 2021-12-18 PROCEDURE — 6360000002 HC RX W HCPCS

## 2021-12-18 PROCEDURE — 3600000004 HC SURGERY LEVEL 4 BASE: Performed by: SURGERY

## 2021-12-18 PROCEDURE — 80053 COMPREHEN METABOLIC PANEL: CPT

## 2021-12-18 PROCEDURE — 6360000002 HC RX W HCPCS: Performed by: INTERNAL MEDICINE

## 2021-12-18 PROCEDURE — 47562 LAPAROSCOPIC CHOLECYSTECTOMY: CPT | Performed by: SURGERY

## 2021-12-18 PROCEDURE — 2580000003 HC RX 258: Performed by: INTERNAL MEDICINE

## 2021-12-18 PROCEDURE — 2500000003 HC RX 250 WO HCPCS

## 2021-12-18 PROCEDURE — 3700000001 HC ADD 15 MINUTES (ANESTHESIA): Performed by: SURGERY

## 2021-12-18 PROCEDURE — 96372 THER/PROPH/DIAG INJ SC/IM: CPT

## 2021-12-18 PROCEDURE — 0FT44ZZ RESECTION OF GALLBLADDER, PERCUTANEOUS ENDOSCOPIC APPROACH: ICD-10-PCS | Performed by: SURGERY

## 2021-12-18 PROCEDURE — 85025 COMPLETE CBC W/AUTO DIFF WBC: CPT

## 2021-12-18 PROCEDURE — 2709999900 HC NON-CHARGEABLE SUPPLY: Performed by: SURGERY

## 2021-12-18 PROCEDURE — 96366 THER/PROPH/DIAG IV INF ADDON: CPT

## 2021-12-18 PROCEDURE — 7100000001 HC PACU RECOVERY - ADDTL 15 MIN: Performed by: SURGERY

## 2021-12-18 PROCEDURE — 6360000002 HC RX W HCPCS: Performed by: ANESTHESIOLOGY

## 2021-12-18 PROCEDURE — 88304 TISSUE EXAM BY PATHOLOGIST: CPT

## 2021-12-18 PROCEDURE — 7100000000 HC PACU RECOVERY - FIRST 15 MIN: Performed by: SURGERY

## 2021-12-18 PROCEDURE — 36415 COLL VENOUS BLD VENIPUNCTURE: CPT

## 2021-12-18 PROCEDURE — 6370000000 HC RX 637 (ALT 250 FOR IP): Performed by: INTERNAL MEDICINE

## 2021-12-18 PROCEDURE — 3600000014 HC SURGERY LEVEL 4 ADDTL 15MIN: Performed by: SURGERY

## 2021-12-18 PROCEDURE — 3700000000 HC ANESTHESIA ATTENDED CARE: Performed by: SURGERY

## 2021-12-18 PROCEDURE — 2500000003 HC RX 250 WO HCPCS: Performed by: SURGERY

## 2021-12-18 RX ORDER — HYDRALAZINE HYDROCHLORIDE 20 MG/ML
5 INJECTION INTRAMUSCULAR; INTRAVENOUS EVERY 10 MIN PRN
Status: DISCONTINUED | OUTPATIENT
Start: 2021-12-18 | End: 2021-12-18 | Stop reason: HOSPADM

## 2021-12-18 RX ORDER — FENTANYL CITRATE 50 UG/ML
50 INJECTION, SOLUTION INTRAMUSCULAR; INTRAVENOUS EVERY 5 MIN PRN
Status: DISCONTINUED | OUTPATIENT
Start: 2021-12-18 | End: 2021-12-18 | Stop reason: HOSPADM

## 2021-12-18 RX ORDER — SODIUM CHLORIDE, SODIUM LACTATE, POTASSIUM CHLORIDE, CALCIUM CHLORIDE 600; 310; 30; 20 MG/100ML; MG/100ML; MG/100ML; MG/100ML
INJECTION, SOLUTION INTRAVENOUS CONTINUOUS
Status: DISCONTINUED | OUTPATIENT
Start: 2021-12-18 | End: 2021-12-18 | Stop reason: HOSPADM

## 2021-12-18 RX ORDER — ONDANSETRON 2 MG/ML
4 INJECTION INTRAMUSCULAR; INTRAVENOUS
Status: DISCONTINUED | OUTPATIENT
Start: 2021-12-18 | End: 2021-12-18 | Stop reason: HOSPADM

## 2021-12-18 RX ORDER — FENTANYL CITRATE 50 UG/ML
INJECTION, SOLUTION INTRAMUSCULAR; INTRAVENOUS PRN
Status: DISCONTINUED | OUTPATIENT
Start: 2021-12-18 | End: 2021-12-18 | Stop reason: SDUPTHER

## 2021-12-18 RX ORDER — BUPIVACAINE HYDROCHLORIDE 5 MG/ML
INJECTION, SOLUTION EPIDURAL; INTRACAUDAL
Status: COMPLETED | OUTPATIENT
Start: 2021-12-18 | End: 2021-12-18

## 2021-12-18 RX ORDER — PHENYLEPHRINE HYDROCHLORIDE 10 MG/ML
INJECTION INTRAVENOUS PRN
Status: DISCONTINUED | OUTPATIENT
Start: 2021-12-18 | End: 2021-12-18 | Stop reason: SDUPTHER

## 2021-12-18 RX ORDER — DOCUSATE SODIUM 100 MG/1
100 CAPSULE, LIQUID FILLED ORAL 2 TIMES DAILY
Qty: 60 CAPSULE | Refills: 0 | COMMUNITY
Start: 2021-12-18 | End: 2022-01-01

## 2021-12-18 RX ORDER — LIDOCAINE HYDROCHLORIDE 20 MG/ML
INJECTION, SOLUTION INTRAVENOUS PRN
Status: DISCONTINUED | OUTPATIENT
Start: 2021-12-18 | End: 2021-12-18 | Stop reason: SDUPTHER

## 2021-12-18 RX ORDER — SUCCINYLCHOLINE/SOD CL,ISO/PF 100 MG/5ML
SYRINGE (ML) INTRAVENOUS PRN
Status: DISCONTINUED | OUTPATIENT
Start: 2021-12-18 | End: 2021-12-18 | Stop reason: SDUPTHER

## 2021-12-18 RX ORDER — FENTANYL CITRATE 50 UG/ML
25 INJECTION, SOLUTION INTRAMUSCULAR; INTRAVENOUS EVERY 5 MIN PRN
Status: DISCONTINUED | OUTPATIENT
Start: 2021-12-18 | End: 2021-12-18 | Stop reason: HOSPADM

## 2021-12-18 RX ORDER — ONDANSETRON 2 MG/ML
4 INJECTION INTRAMUSCULAR; INTRAVENOUS
Status: COMPLETED | OUTPATIENT
Start: 2021-12-18 | End: 2021-12-18

## 2021-12-18 RX ORDER — PROPOFOL 10 MG/ML
INJECTION, EMULSION INTRAVENOUS PRN
Status: DISCONTINUED | OUTPATIENT
Start: 2021-12-18 | End: 2021-12-18 | Stop reason: SDUPTHER

## 2021-12-18 RX ORDER — ONDANSETRON 2 MG/ML
INJECTION INTRAMUSCULAR; INTRAVENOUS PRN
Status: DISCONTINUED | OUTPATIENT
Start: 2021-12-18 | End: 2021-12-18 | Stop reason: SDUPTHER

## 2021-12-18 RX ORDER — LABETALOL HYDROCHLORIDE 5 MG/ML
5 INJECTION, SOLUTION INTRAVENOUS EVERY 10 MIN PRN
Status: DISCONTINUED | OUTPATIENT
Start: 2021-12-18 | End: 2021-12-18 | Stop reason: HOSPADM

## 2021-12-18 RX ORDER — OXYCODONE HYDROCHLORIDE AND ACETAMINOPHEN 5; 325 MG/1; MG/1
1 TABLET ORAL EVERY 6 HOURS PRN
Qty: 14 TABLET | Refills: 0 | Status: SHIPPED | OUTPATIENT
Start: 2021-12-18 | End: 2021-12-23

## 2021-12-18 RX ORDER — ROCURONIUM BROMIDE 10 MG/ML
INJECTION, SOLUTION INTRAVENOUS PRN
Status: DISCONTINUED | OUTPATIENT
Start: 2021-12-18 | End: 2021-12-18 | Stop reason: SDUPTHER

## 2021-12-18 RX ORDER — DEXAMETHASONE SODIUM PHOSPHATE 4 MG/ML
INJECTION, SOLUTION INTRA-ARTICULAR; INTRALESIONAL; INTRAMUSCULAR; INTRAVENOUS; SOFT TISSUE PRN
Status: DISCONTINUED | OUTPATIENT
Start: 2021-12-18 | End: 2021-12-18 | Stop reason: SDUPTHER

## 2021-12-18 RX ADMIN — PHENYLEPHRINE HYDROCHLORIDE 100 MCG: 10 INJECTION INTRAVENOUS at 07:53

## 2021-12-18 RX ADMIN — Medication 100 MG: at 07:46

## 2021-12-18 RX ADMIN — PROPOFOL 120 MG: 10 INJECTION, EMULSION INTRAVENOUS at 07:46

## 2021-12-18 RX ADMIN — ONDANSETRON 4 MG: 2 INJECTION INTRAMUSCULAR; INTRAVENOUS at 08:32

## 2021-12-18 RX ADMIN — ENOXAPARIN SODIUM 40 MG: 100 INJECTION SUBCUTANEOUS at 10:35

## 2021-12-18 RX ADMIN — FENTANYL CITRATE 50 MCG: 50 INJECTION, SOLUTION INTRAMUSCULAR; INTRAVENOUS at 08:12

## 2021-12-18 RX ADMIN — ASPIRIN 81 MG: 81 TABLET, COATED ORAL at 10:35

## 2021-12-18 RX ADMIN — PIPERACILLIN AND TAZOBACTAM 3375 MG: 3; .375 INJECTION, POWDER, LYOPHILIZED, FOR SOLUTION INTRAVENOUS at 06:41

## 2021-12-18 RX ADMIN — DEXAMETHASONE SODIUM PHOSPHATE 4 MG: 4 INJECTION, SOLUTION INTRAMUSCULAR; INTRAVENOUS at 07:51

## 2021-12-18 RX ADMIN — LIDOCAINE HYDROCHLORIDE 80 MG: 20 INJECTION, SOLUTION INTRAVENOUS at 07:46

## 2021-12-18 RX ADMIN — PHENYLEPHRINE HYDROCHLORIDE 100 MCG: 10 INJECTION INTRAVENOUS at 07:55

## 2021-12-18 RX ADMIN — FENTANYL CITRATE 50 MCG: 50 INJECTION, SOLUTION INTRAMUSCULAR; INTRAVENOUS at 07:43

## 2021-12-18 RX ADMIN — ROCURONIUM BROMIDE 40 MG: 10 INJECTION INTRAVENOUS at 07:52

## 2021-12-18 RX ADMIN — FLUTICASONE PROPIONATE 1 SPRAY: 50 SPRAY, METERED NASAL at 10:35

## 2021-12-18 RX ADMIN — ONDANSETRON 4 MG: 2 INJECTION INTRAMUSCULAR; INTRAVENOUS at 09:21

## 2021-12-18 RX ADMIN — SUGAMMADEX 200 MG: 100 INJECTION, SOLUTION INTRAVENOUS at 08:35

## 2021-12-18 ASSESSMENT — PULMONARY FUNCTION TESTS
PIF_VALUE: 23
PIF_VALUE: 20
PIF_VALUE: 15
PIF_VALUE: 21
PIF_VALUE: 2
PIF_VALUE: 15
PIF_VALUE: 15
PIF_VALUE: 0
PIF_VALUE: 21
PIF_VALUE: 15
PIF_VALUE: 20
PIF_VALUE: 8
PIF_VALUE: 22
PIF_VALUE: 16
PIF_VALUE: 20
PIF_VALUE: 0
PIF_VALUE: 15
PIF_VALUE: 15
PIF_VALUE: 0
PIF_VALUE: 15
PIF_VALUE: 15
PIF_VALUE: 20
PIF_VALUE: 15
PIF_VALUE: 16
PIF_VALUE: 15
PIF_VALUE: 0
PIF_VALUE: 0
PIF_VALUE: 16
PIF_VALUE: 19
PIF_VALUE: 15
PIF_VALUE: 21
PIF_VALUE: 14
PIF_VALUE: 15
PIF_VALUE: 1
PIF_VALUE: 21
PIF_VALUE: 21
PIF_VALUE: 4
PIF_VALUE: 14
PIF_VALUE: 8
PIF_VALUE: 16
PIF_VALUE: 15
PIF_VALUE: 21
PIF_VALUE: 1
PIF_VALUE: 15
PIF_VALUE: 15
PIF_VALUE: 22
PIF_VALUE: 17
PIF_VALUE: 15
PIF_VALUE: 0
PIF_VALUE: 15
PIF_VALUE: 15
PIF_VALUE: 21
PIF_VALUE: 15
PIF_VALUE: 15
PIF_VALUE: 20
PIF_VALUE: 21
PIF_VALUE: 20
PIF_VALUE: 15
PIF_VALUE: 19
PIF_VALUE: 15
PIF_VALUE: 20
PIF_VALUE: 22
PIF_VALUE: 16
PIF_VALUE: 15

## 2021-12-18 ASSESSMENT — PAIN - FUNCTIONAL ASSESSMENT: PAIN_FUNCTIONAL_ASSESSMENT: 0-10

## 2021-12-18 ASSESSMENT — PAIN SCALES - GENERAL: PAINLEVEL_OUTOF10: 0

## 2021-12-18 NOTE — OP NOTE
Operative Note      Patient: Kee Centeno  YOB: 1940  MRN: [id number]    Date of Procedure: 12/18/2021    Pre-Op Diagnosis: ACUTE CHOLECYSTITIS    Post-Op Diagnosis: Same       Procedure(s):  CHOLECYSTECTOMY LAPAROSCOPIC    Surgeon(s):  Evangelina Chiang MD    Assistant:   * No surgical staff found *    Anesthesia: General    Estimated Blood Loss (mL): 49XS    Complications: None    Specimens:   ID Type Source Tests Collected by Time Destination   A : GALLBLADDER AND CONTENTS Tissue Gallbladder SURGICAL PATHOLOGY Evangelina Chiang MD 12/18/2021 0827        Implants:  * No implants in log *      Drains: * No LDAs found *    Findings: acute cholecystitis    Detailed Description of Procedure: The patient was met in the pre-operative holding area. An informed consent was obtained after discussing the risks, benefits, complications, treatment options, and expected outcomes. The risks discussed included: adverse reaction to medication, aspiration, injury to abdominal organs or biliary structures, bleeding, infection, the need for additional procedures or the possible need to convert to an open procedure. The patient and/or family concurred with the proposed plan, giving informed consent. The patient was transported to the operating room. Once in the operating room, the patient was transferred onto the operating room table and placed in the supine position. The patient was secured to the operating room table with multiple straps. All pressure points were padded appropriately. General anesthesia was induced and tolerated without incident. The patient's abdomen was prepped and draped in the standard, sterile fashion. Antibiotic prophylaxis was administered in accordance with national protocol. A time-out was held with all members of the operating room team present and in agreement. Local anesthetic was injected into the neymar-umbilical skin and an incision was made.   Alec Brizuela clamp was used to grasp the umbilical fascia and elevate it while a Veress needle was placed. The needle was aspirated and drop test was performed and the abdomen was insufflated to 15 mmHg. Using a 5 mm optical trocar, the peritoneum was entered without incidence or damage to nearby structures. 3 additional trocars were introduced under direct vision. All skin incisions were infiltrated with local anesthetic prior to making the incisions and placing the trocars. The patient was then positioned in reverse trendelenburg with the right side up. The gallbladder was identified, the fundus grasped, and retracted cephalad. The gallbladder was acutely inflamed. It was decompressed with suction  prior to being able to grasp and remove it. Adhesions were taken down with a combination of blunt dissection and with electrocautery, taking care not to injure any adjacent organs or viscus. The infundibulum was grasped and retracted laterally, exposing the peritoneum overlying the triangle of Calot. This peritoneum was divided and exposed in a blunt fashion. The cystic duct was clearly identified and bluntly dissected circumferentially. The junctions of the gallbladder and cystic duct were clearly identified. The cystic artery was clearly identified in a similar fashion. At the point, the critical view of safety was accomplished: the hepatocystic triangle was cleared of fat and fibrous tissue, the lower one third of the gallbladder was  from the liver to expose the cystic plate, and two and only two structures were seen entering the gallbladder. Surgical clips were applied to the cystic duct and cystic artery (two on the stay side and one on the specimen side for each structure) and the cystic duct and cystic artery were ligated with Endo Hedy. The gallbladder was dissected from the liver bed in retrograde fashion with the electrocautery.  An Endo Catch specimen retrieval bag was introduced into the patient's abdomen and the gallbladder was placed into the bag. The gallbladder was removed from the patient's abdomen under direct vision. The liver bed was inspected. Hemostasis was appropriate. Any spilled blood or bile was removed with suction . The port site the gallbladder was removed through was closed using a suture passer and 0 vicryl suture. Pneumoperitoneum was desufflated after viewing removal of the remaining trocars under direct vision. The wounds were then closed with absorbable suture. Dermabond was applied. At the end of the case, instrument counts, sponge counts, and needle counts were correct. The patient was extubated and transferred to the PACU in stable condition.     Electronically signed by Jeanmarie Mansfield MD on 12/18/2021 at 8:34 AM      Electronically signed by Jeanmarie Mansfield MD on 12/18/2021 at 8:33 AM

## 2021-12-18 NOTE — DISCHARGE SUMMARY
HOSPITALIST DISCHARGE SUMMARY     Patient ID: Brenden Knight [id number]                 [id number]      PCP:  Myesha Del Rio MD    Admit date: 12/17/2021   Discharge date: 12/18/2021      Admitting Physician: Yesenia Nava DO  Attending Physician(s): Rocio Jordan MD, MD;    Discharge Physician: Rocio Jordan MD, MD.    Consultant(s):   IP CONSULT TO GENERAL SURGERY    Admitting Diagnoses: Abdominal pain  Discharge Diagnoses: Principal Problem:    Acute cholecystitis  Active Problems:    Cholelithiasis    Epigastric abdominal pain    HLD (hyperlipidemia)    Essential hypertension  Resolved Problems:    * No resolved hospital problems. *    Discharged Condition: stable  Disposition: home    Procedures: Laparoscopic cholecystectomy  Complications: None documented    Brief History: Brenden Knight is a 80 y.o.  male  who presents with history of hypertension, hyperlipidemia, history of polio who presented to [facility] ER with acute epigastric abdominal pain yesterday with associated nausea and nonbilious vomiting with no hematemesis. Pain has since improved. No fevers or chills. No constipation or diarrhea. CT abdomen with noted for evidence of cholecystitis and so was the ultrasound of abdomen with cholelithiasis but no choledocholithiasis. He was transferred for further evaluation here. Patient was seen by general surgery with plans for cholecystectomy tomorrow    Hospital Course: Admitted as above, seen by general surgery, had laparoscopic cholecystectomy. Patient was seen with his sister-in-law at the bedside. Patient had been discharged by RN before I could complete the med rec and place the order for discharge. He will be following up with Dr. Ivette Steven as arranged.     Prognosis: Fair to good    Physical Examination: General appearance: alert, appears stated age and cooperative  Head: Normocephalic, without obvious abnormality, atraumatic  Lungs: clear to auscultation bilaterally  Heart: regular rate and rhythm, S1, S2 normal, no murmur, click, rub or gallop  Abdomen: Mild tenderness with no rebound  Extremities: extremities normal, atraumatic, no cyanosis or edema  Neurologic: Grossly normal    Significant Diagnostic Studies: None  Pending Investigation/labs: Pathology pending    Recent Labs:  CBC with Differential:    Lab Results   Component Value Date    WBC 5.8 12/18/2021    RBC 3.97 12/18/2021    HGB 12.9 12/18/2021    HCT 40.1 12/18/2021     12/18/2021    .0 12/18/2021    MCH 32.5 12/18/2021    MCHC 32.2 12/18/2021    RDW 12.7 12/18/2021    SEGSPCT 74.0 12/18/2021    LYMPHOPCT 10.6 12/18/2021    MONOPCT 12.3 12/18/2021    BASOPCT 0.5 12/18/2021    MONOSABS 0.7 12/18/2021    LYMPHSABS 0.6 12/18/2021    EOSABS 0.1 12/18/2021    BASOSABS 0.0 12/18/2021    DIFFTYPE AUTOMATED DIFFERENTIAL 12/18/2021     CMP:    Lab Results   Component Value Date     12/18/2021    K 4.3 12/18/2021     12/18/2021    CO2 24 12/18/2021    BUN 16 12/18/2021    CREATININE 1.2 12/18/2021    GFRAA >60 12/18/2021    LABGLOM 58 12/18/2021    GLUCOSE 97 12/18/2021    PROT 5.8 12/18/2021    LABALBU 3.7 12/18/2021    CALCIUM 8.6 12/18/2021    BILITOT 0.9 12/18/2021    ALKPHOS 114 12/18/2021     12/18/2021     12/18/2021       Patient Instructions  Medications:     Medication List      START taking these medications    docusate sodium 100 MG capsule  Commonly known as: COLACE  Take 1 capsule by mouth 2 times daily for 14 days     oxyCODONE-acetaminophen 5-325 MG per tablet  Commonly known as: Percocet  Take 1 tablet by mouth every 6 hours as needed for Pain for up to 5 days. Intended supply: 5 days.  Take lowest dose possible to manage pain        CONTINUE taking these medications    aspirin 81 MG tablet     fluticasone 50 MCG/ACT nasal spray  Commonly known as: FLONASE     hydroCHLOROthiazide 25 MG tablet  Commonly known as: HYDRODIURIL  Take 0.5 tablets by mouth every morning     lovastatin 40 MG tablet  Commonly known as: MEVACOR     VITAMIN B 12 PO           Where to Get Your Medications      You can get these medications from any pharmacy    Bring a paper prescription for each of these medications  · oxyCODONE-acetaminophen 5-325 MG per tablet  You don't need a prescription for these medications  · docusate sodium 100 MG capsule         Activity: activity as tolerated  Diet: low fat, low cholesterol diet  Wound Care: keep wound clean and dry and as directed    Follow-up with:   Fernando Ghotra MD  [address]  [phone]    In 2 weeks  for surgical follow up    Services: None      Electronically Signed by: Rose Severino MD, MD.    Time spent on discharge/dictation: 40 minutes

## 2021-12-18 NOTE — PROGRESS NOTES
4324 Patient received from the OR monitor placed and alarms on. Report received from [address] [place] [address].  0900 resting quietly with his eyes closed no signs of distress noted arouses easily when his name is called and denies any pain or discomfort   0922 zofran iv push given for complaints of nausea. Noted to be burping. Denies any pain. Patient states he gets dry heaves at home   0930 report called to Alexx ALARCON prior to transport to room   0943 transferred back to room 1116 via bed and belongings in bag.  Alexx ALARCON at bedside

## 2021-12-18 NOTE — PROGRESS NOTES
GENERAL SURGERY PROGRESS NOTE    Isabel Cavazos is a 80 y.o. male with acute cholecystitis. Subjective:  Symptoms remain improved this morning. Denies questions regarding planned surgery    Objective:    Vitals: VITALS:  /70   Pulse 72   Temp 98.2 °F (36.8 °C) (Oral)   Resp 18   Ht 5' 11\" (1.803 m)   Wt 156 lb (70.8 kg)   SpO2 95%   BMI 21.76 kg/m²     I/O: 12/17 0701 - 12/18 0700  In: -   Out: 350 [Urine:350]    Labs/Imaging Results:   Lab Results   Component Value Date     12/17/2021    K 3.8 12/17/2021     12/17/2021    CO2 28 12/17/2021    BUN 13 12/17/2021    CREATININE 1.1 12/17/2021    GLUCOSE 115 12/17/2021    CALCIUM 8.6 12/17/2021      Lab Results   Component Value Date    WBC 5.8 12/18/2021    HGB 12.9 (L) 12/18/2021    HCT 40.1 (L) 12/18/2021    .0 (H) 12/18/2021     12/18/2021         IV Fluids: sodium chloride Last Rate: 75 mL/hr at 12/17/21 1522    sodium chloride    Scheduled Meds:   aspirin, 81 mg, Oral, Daily    fluticasone, 1 spray, Nasal, Daily    atorvastatin, 10 mg, Oral, Nightly    piperacillin-tazobactam, 3,375 mg, IntraVENous, Q8H    sodium chloride flush, 5-40 mL, IntraVENous, 2 times per day    enoxaparin, 40 mg, SubCUTAneous, Daily    Physical Exam:  General: A&O x 3, no distress. HEENT: Anicteric sclerae, MMM. Extremities: No edema bilat LE. Abdomen: Soft, nontender, nondistended. Assessment and Plan:  80 y.o. male with acute cholecystitis.     Patient Active Problem List:     Bright red rectal bleeding     Diverticulosis     Acute cholecystitis     Cholelithiasis     Epigastric abdominal pain     HLD (hyperlipidemia)     Essential hypertension      Plan for laparoscopic cholecystectomy today    Risks and benefits were discussed  Violet Obrien MD

## 2021-12-18 NOTE — ANESTHESIA POSTPROCEDURE EVALUATION
Department of Anesthesiology  Postprocedure Note    Patient: Isaac Larios  MRN: [id number]  YOB: 1940  Date of evaluation: 12/18/2021  Time:  11:24 AM     Procedure Summary     Date: 12/18/21 Room / Location: [address]    Anesthesia Start: 5411 Anesthesia Stop: 0848    Procedure: CHOLECYSTECTOMY LAPAROSCOPIC (N/A Abdomen) Diagnosis: (ACUTE CHOLECYSTITIS)    Surgeons: Rafa Martinez MD Responsible Provider: Georg Alpers, MD    Anesthesia Type: general ASA Status: 2 - Emergent          Anesthesia Type: general    Cassia Phase I: Cassia Score: 10    Cassia Phase II:      Last vitals: Reviewed and per EMR flowsheets.        Anesthesia Post Evaluation    Patient location during evaluation: PACU  Patient participation: complete - patient participated  Level of consciousness: sleepy but conscious  Pain score: 1  Airway patency: patent  Nausea & Vomiting: no nausea and no vomiting  Complications: no  Cardiovascular status: hemodynamically stable  Respiratory status: acceptable  Hydration status: euvolemic

## 2021-12-20 ENCOUNTER — CARE COORDINATION (OUTPATIENT)
Dept: CASE MANAGEMENT | Age: 81
End: 2021-12-20

## 2021-12-20 DIAGNOSIS — K80.00 CALCULUS OF GALLBLADDER WITH ACUTE CHOLECYSTITIS WITHOUT OBSTRUCTION: Primary | ICD-10-CM

## 2021-12-20 PROCEDURE — 1111F DSCHRG MED/CURRENT MED MERGE: CPT | Performed by: INTERNAL MEDICINE

## 2021-12-20 NOTE — CARE COORDINATION
[facility] Transitions Initial Follow Up Call    Call within 2 business days of discharge: Yes    Patient: Tony Coker Patient :    MRN: [id number]  Reason for Admission: Cholelithiasis  Discharge Date: 21 RARS: Readmission Risk Score: 10.6 ( )      Last Discharge [facility]       Complaint Diagnosis Description Type Department Provider    21  Acute cholecystitis Admission (Discharged) Krystin Flores MD           Spoke with: Pastora Dill, pleasant, reports he is feeling pretty good since home from the hospital. Pain has been well controlled. Only needed to take Percocet x 1 dose last night before bed & he states \" I slept like a baby. \" Otherwise only a couple doses of Tylenol in the daytime. Pt denies  N&V. He is passing flatus freely. Has not had a BM yet, is taking Colace as prescribed. Reviewed DC meds, taking all as prescribed. Pt states no issues to report @ this time. Reviewed AVS, reiterated post op instructions per surgeon's instructions:   OK to shower but no bath tub or submerging incision under water. Pat the incisions dry with a towel after showers. Keep wound dry and clean. No driving for at least 24 hours after your procedure. No driving until off narcotic pain  medications and walking comfortably. No return to work until cleared @ post op surgeon's visit. Diet is to be bland on day of surgery then regular diet. Emphasized  importance of follow up appt  with surgeon & PCP. He verbalizes understanding. Has post op appt 2022 with Dr Britta Paredes. He states his sister in law with take him to appt. Pt is declining assistance with PCP appt. Sister in law will be helping pt @ home for any needs.      Facility: [address] [place] [address]    Non-face-to-face services provided:  Scheduled appointment with Specialist--  Obtained and reviewed discharge summary and/or continuity of care documents  Assessment and support for treatment adherence and medication management---    Care Transitions 24 Hour Call    Schedule Follow Up Appointment with PCP: Completed  Do you have any ongoing symptoms?: No  Do you have a copy of your discharge instructions?: Yes  Do you have all of your prescriptions and are they filled?: Yes  Have you been contacted by a [address]?: No  Have you scheduled your follow up appointment?: Yes  How are you going to get to your appointment?: Car - family or friend to transport (Comment: sis in law)  Were you discharged with any Home Care or Post Acute Services: No  Do you feel like you have everything you need to keep you well at home?: Yes  Care Transitions Interventions   Home Care Waiver: Declined        Transportation Support: Declined      DME Assistance: Declined     Senior Services: Declined           Follow Up  Future Appointments   Date Time Provider Salvador Arnold   2022  9:15 AM Real Hernandez MD [address] [facility]     Transitions of Care Initial Call    Was this an external facility discharge? No Discharge Facility: [facility]    Challenges to be reviewed by the provider   Additional needs identified to be addressed with provider: No  none             Method of communication with provider : none      Advance Care Planning:   Does patient have an Advance Directive: not on file; education provided. Was this a readmission? No  Patient stated reason for admission: [place]es  Patients top risk factors for readmission: medical condition-GIB, HLD, HTN, Diverticulosis    Care Transition Nurse (CTN) contacted the patient by telephone to perform post hospital discharge assessment. Verified name and  with patient as identifiers. Provided introduction to self, and explanation of the CTN role. CTN reviewed discharge instructions, medical action plan and red flags with patient who verbalized understanding. Patient given an opportunity to ask questions and does not have any further questions or concerns at this time.  Were discharge instructions available to patient? Yes. Reviewed appropriate site of care based on symptoms and resources available to patient including: PCP, Specialist, Urgent care clinics, Home health and When to call 911. The patient agrees to contact the PCP office for questions related to their healthcare. Medication reconciliation was performed with patient, who verbalizes understanding of administration of home medications. Advised obtaining a 90-day supply of all daily and as-needed medications. Covid Risk Education     Educated patient about risk for severe COVID-19 due to risk factors according to CDC guidelines. CTN reviewed discharge instructions, medical action plan and red flag symptoms with the patient who verbalized understanding. Discussed COVID vaccination status: Yes. Education provided on COVID-19 vaccination as appropriate. Discussed exposure protocols and quarantine with CDC Guidelines. Patient was given an opportunity to verbalize any questions and concerns and agrees to contact CTN or health care provider for questions related to their healthcare. Reviewed and educated patient on any new and changed medications related to discharge diagnosis. Was patient discharged with a pulse oximeter? No    CTN provided contact information. Plan for follow-up call in 5-7 days based on severity of symptoms and risk factors.   Plan for next call: symptom management--  follow up appointment--      Bonnie Rodriguez RN -[phone]

## 2022-01-12 ENCOUNTER — OFFICE VISIT (OUTPATIENT)
Dept: SURGERY | Age: 82
End: 2022-01-12

## 2022-01-12 VITALS
OXYGEN SATURATION: 98 % | BODY MASS INDEX: 22.65 KG/M2 | HEART RATE: 72 BPM | DIASTOLIC BLOOD PRESSURE: 92 MMHG | SYSTOLIC BLOOD PRESSURE: 144 MMHG | HEIGHT: 71 IN | WEIGHT: 161.8 LBS

## 2022-01-12 DIAGNOSIS — Z48.89 POSTOPERATIVE VISIT: Primary | ICD-10-CM

## 2022-01-12 PROCEDURE — 99024 POSTOP FOLLOW-UP VISIT: CPT | Performed by: SURGERY

## 2022-01-12 ASSESSMENT — PATIENT HEALTH QUESTIONNAIRE - PHQ9
SUM OF ALL RESPONSES TO PHQ QUESTIONS 1-9: 0
1. LITTLE INTEREST OR PLEASURE IN DOING THINGS: 0
2. FEELING DOWN, DEPRESSED OR HOPELESS: 0
SUM OF ALL RESPONSES TO PHQ QUESTIONS 1-9: 0
SUM OF ALL RESPONSES TO PHQ9 QUESTIONS 1 & 2: 0

## 2022-01-12 NOTE — PROGRESS NOTES
Post-Operative Clinic Note    Chief Complaint   Patient presents with    Post-Op Check     1st PO-LAP Sachi @ [facility] 12/18/21         SUBJECTIVE:  Patient is here today for a post-operative visit. Patient is s/p laparoscopic cholecystectomy on 12/18. He reports eatings well. No abdominal pain. He reports some weight loss. BP a little high today 140s/90s, he plans to see his PCP for this. Past Surgical History:   Procedure Laterality Date    CHEST TUBE INSERTION      CHOLECYSTECTOMY, LAPAROSCOPIC N/A 12/18/2021    CHOLECYSTECTOMY LAPAROSCOPIC performed by Alyson Christiansen MD at [place] [address]      as child     Past Medical History:   Diagnosis Date    Diverticulitis     Hyperlipidemia     Pneumothorax, right     Rectal bleeding     Seasonal allergic reaction      Family History   Problem Relation Age of Onset    Heart Disease Mother     Cancer Mother     Diabetes Father     Diabetes Brother     Cancer Sister      Social History     Socioeconomic History    Marital status:      Spouse name: Not on file    Number of children: Not on file    Years of education: Not on file    Highest education level: Not on file   Occupational History    Not on file   Tobacco Use    Smoking status: Never Smoker    Smokeless tobacco: Never Used   Vaping Use    Vaping Use: Never used   Substance and Sexual Activity    Alcohol use: No    Drug use: No    Sexual activity: Not Currently   Other Topics Concern    Not on file   Social History Narrative    Not on file     Social Determinants of Health     Financial Resource Strain:     Difficulty of Paying Living Expenses: Not on file   Food Insecurity:     Worried About Running Out of Food in the Last Year: Not on file    Zainab of Food in the Last Year: Not on file   Transportation Needs:     Lack of Transportation (Medical): Not on file    Lack of Transportation (Non-Medical):  Not on file   Physical Activity:     Days of Exercise per Week: Not on file    Minutes of Exercise per Session: Not on file   Stress:     Feeling of Stress : Not on file   Social Connections:     Frequency of Communication with Friends and Family: Not on file    Frequency of Social Gatherings with Friends and Family: Not on file    Attends Muslim Services: Not on file    Active Member of [address] [place] [address] or Organizations: Not on file    Attends Club or Organization Meetings: Not on file    Marital Status: Not on file   Intimate Partner Violence:     Fear of Current or Ex-Partner: Not on file    Emotionally Abused: Not on file    Physically Abused: Not on file    Sexually Abused: Not on file   Housing Stability:     Unable to Pay for Housing in the Last Year: Not on file    Number of [place] in the Last Year: Not on file    Unstable Housing in the Last Year: Not on file       OBJECTIVE:  Physical Exam  General: awake, alert, in no acute distress  HEENT: mucous membranes moist  Respiratory: normal effort, no wheezes appreciated  CV: appears well perfused  Abdomen: Soft, nontender, non-distended. Incisions clean and dry  Skin: warm and dry  Extremities: atraumatic  Neuro: no focal deficits noted  Psych: mood normal        Pathology report reveals:   Acute cholecystitis with stones    ASSESSMENT:  80 y.o. male s/p laparoscopic cholecystectomy. Doing well Pathology report was reviewed.     1. Postoperative visit        PLAN:  Doing well s/p cholecystectomy  - no restrictions from a surgical standpoint  - call if problems arise      Carlos Alberto Hull MD  1/12/2022  9:31 AM

## 2022-01-12 NOTE — PROGRESS NOTES
Post-Operative Clinic Note    Chief Complaint   Patient presents with    Post-Op Check     1st PO-LAP Sachi @ [facility] 12/18/21         SUBJECTIVE:  Patient is here today for a post-operative visit. Patient is s/p *** on ***. Past Surgical History:   Procedure Laterality Date    CHEST TUBE INSERTION      CHOLECYSTECTOMY, LAPAROSCOPIC N/A 12/18/2021    CHOLECYSTECTOMY LAPAROSCOPIC performed by Tania Chung MD at [address] [place] [address]      as child     Past Medical History:   Diagnosis Date    Diverticulitis     Hyperlipidemia     Pneumothorax, right     Rectal bleeding     Seasonal allergic reaction      Family History   Problem Relation Age of Onset    Heart Disease Mother     Cancer Mother     Diabetes Father     Diabetes Brother     Cancer Sister      Social History     Socioeconomic History    Marital status:      Spouse name: Not on file    Number of children: Not on file    Years of education: Not on file    Highest education level: Not on file   Occupational History    Not on file   Tobacco Use    Smoking status: Never Smoker    Smokeless tobacco: Never Used   Vaping Use    Vaping Use: Never used   Substance and Sexual Activity    Alcohol use: No    Drug use: No    Sexual activity: Not Currently   Other Topics Concern    Not on file   Social History Narrative    Not on file     Social Determinants of Health     Financial Resource Strain:     Difficulty of Paying Living Expenses: Not on file   Food Insecurity:     Worried About Running Out of Food in the Last Year: Not on file    Zainab of Food in the Last Year: Not on file   Transportation Needs:     Lack of Transportation (Medical): Not on file    Lack of Transportation (Non-Medical):  Not on file   Physical Activity:     Days of Exercise per Week: Not on file    Minutes of Exercise per Session: Not on file   Stress:     Feeling of Stress : Not on file   Social Connections:     Frequency of Communication with Friends and Family: Not on file    Frequency of Social Gatherings with Friends and Family: Not on file    Attends Episcopal Services: Not on file    Active Member of Clubs or Organizations: Not on file    Attends Club or Organization Meetings: Not on file    Marital Status: Not on file   Intimate Partner Violence:     Fear of Current or Ex-Partner: Not on file    Emotionally Abused: Not on file    Physically Abused: Not on file    Sexually Abused: Not on file   Housing Stability:     Unable to Pay for Housing in the Last Year: Not on file    Number of [place] in the Last Year: Not on file    Unstable Housing in the Last Year: Not on file       OBJECTIVE:  Physical Exam  General: awake, alert, in no acute distress  HEENT: mucous membranes moist  Respiratory: normal effort, no wheezes appreciated  CV: appears well perfused  Abdomen: Soft, ***tender, non-distended. ***  Skin: warm and dry  Extremities: atraumatic  Neuro: no focal deficits noted  Psych: mood normal        Pathology report reveals:   ***    ASSESSMENT:  80 y.o. male s/p *** Pathology report was reviewed. No diagnosis found.     PLAN:  ***      Sandor Epley, MD  1/12/2022  9:28 AM

## 2022-01-13 ENCOUNTER — CARE COORDINATION (OUTPATIENT)
Dept: CASE MANAGEMENT | Age: 82
End: 2022-01-13

## 2022-01-13 NOTE — CARE COORDINATION
Care Transitions Follow Up Call    Needs to be reviewed by the provider   Additional needs identified to be addressed with provider: No  none             Method of communication with provider : none      Care Transition Nurse (CTN) contacted the patient by telephone to follow up after admission on 2021 for  Lap Sachi (2021). Verified name and  with patient as identifiers. Spoke with Zeeshan Don, reports he is doing well. He had follow up with surgeon Dr Festus Ruelas 2022, good report/progress noted. Pt has minimal pain. Passing flatus, regular BM's, tolerating diet, no N&V. Pt does report his BP has been a bit high for him- 140's/90's-working [facility] PCP Dr Nicci Cobb for BP management. Addressed changes since last contact: none  Discussed follow-up appointments. If no appointment was previously scheduled, appointment scheduling offered: Yes. Is follow up appointment scheduled within 7 days of discharge? Yes. Advance Care Planning:   Does patient have an Advance Directive: patient declined education. CTN reviewed discharge instructions, medical action plan and red flags with patient and discussed any barriers to care and/or understanding of plan of care after discharge. Discussed appropriate site of care based on symptoms and resources available to patient including: PCP, Specialist, Urgent care clinics and When to call 911. The patient agrees to contact the PCP office for questions related to their healthcare. Patients top risk factors for readmission: none  Interventions to address risk factors: Scheduled appointment with Specialist- PCP Dr Nicci Cobb      Non-[facility] follow up appointment(s): PCP Dr Carmelo Walter provided contact information for future needs. Plan for follow-up call in 5-7 days based on severity of symptoms and risk factors. Plan for next call:  BP/ symptom management.      Kallie Greer RN [phone]

## 2023-01-09 ENCOUNTER — HOSPITAL ENCOUNTER (OUTPATIENT)
Dept: ULTRASOUND IMAGING | Age: 83
Discharge: HOME | End: 2023-01-09
Payer: MEDICARE

## 2023-01-09 DIAGNOSIS — K40.90 INDIRECT LEFT INGUINAL HERNIA: ICD-10-CM

## 2023-01-09 PROCEDURE — 76882 US LMTD JT/FCL EVL NVASC XTR: CPT

## 2023-01-24 ENCOUNTER — OFFICE VISIT (OUTPATIENT)
Dept: SURGERY | Age: 83
End: 2023-01-24
Payer: MEDICARE

## 2023-01-24 VITALS
WEIGHT: 160 LBS | SYSTOLIC BLOOD PRESSURE: 166 MMHG | DIASTOLIC BLOOD PRESSURE: 88 MMHG | HEART RATE: 86 BPM | HEIGHT: 71 IN | BODY MASS INDEX: 22.4 KG/M2

## 2023-01-24 DIAGNOSIS — K40.90 LEFT INGUINAL HERNIA: Primary | ICD-10-CM

## 2023-01-24 PROCEDURE — G8420 CALC BMI NORM PARAMETERS: HCPCS | Performed by: SURGERY

## 2023-01-24 PROCEDURE — G8484 FLU IMMUNIZE NO ADMIN: HCPCS | Performed by: SURGERY

## 2023-01-24 PROCEDURE — 1123F ACP DISCUSS/DSCN MKR DOCD: CPT | Performed by: SURGERY

## 2023-01-24 PROCEDURE — 99204 OFFICE O/P NEW MOD 45 MIN: CPT | Performed by: SURGERY

## 2023-01-24 PROCEDURE — 3079F DIAST BP 80-89 MM HG: CPT | Performed by: SURGERY

## 2023-01-24 PROCEDURE — G8427 DOCREV CUR MEDS BY ELIG CLIN: HCPCS | Performed by: SURGERY

## 2023-01-24 PROCEDURE — 3077F SYST BP >= 140 MM HG: CPT | Performed by: SURGERY

## 2023-01-24 PROCEDURE — 1036F TOBACCO NON-USER: CPT | Performed by: SURGERY

## 2023-01-24 RX ORDER — SODIUM CHLORIDE 9 MG/ML
INJECTION, SOLUTION INTRAVENOUS PRN
OUTPATIENT
Start: 2023-01-24

## 2023-01-24 RX ORDER — SODIUM CHLORIDE 0.9 % (FLUSH) 0.9 %
5-40 SYRINGE (ML) INJECTION PRN
OUTPATIENT
Start: 2023-01-24

## 2023-01-24 RX ORDER — LOSARTAN POTASSIUM 50 MG/1
50 TABLET ORAL DAILY
COMMUNITY
Start: 2022-09-30

## 2023-01-24 RX ORDER — SODIUM CHLORIDE 0.9 % (FLUSH) 0.9 %
5-40 SYRINGE (ML) INJECTION EVERY 12 HOURS SCHEDULED
OUTPATIENT
Start: 2023-01-24

## 2023-01-24 NOTE — PROGRESS NOTES
Chief Complaint   Patient presents with    Follow-up       SUBJECTIVE:  HPI: Patient presents for evaluation of left inguinal hernia. Symptoms were first noted several months ago. Pain is intermittent . Lump is reducible. Pt denies obstructive symptoms. Pt with previous history of abdominal surgery. I performed laparoscopic cholecystectomy on him in the past.     I have reviewed the patient's(pertinent information to this visit) medical history, family history, social history and review of systems with the patient today in the office. Past Surgical History:   Procedure Laterality Date    CHEST TUBE INSERTION      CHOLECYSTECTOMY, LAPAROSCOPIC N/A 12/18/2021    CHOLECYSTECTOMY LAPAROSCOPIC performed by Bebe Landeros MD at [address] [place] [address]      as child     Past Medical History:   Diagnosis Date    Diverticulitis     Hyperlipidemia     Pneumothorax, right     Rectal bleeding     Seasonal allergic reaction      Family History   Problem Relation Age of Onset    Heart Disease Mother     Cancer Mother     Diabetes Father     Diabetes Brother     Cancer Sister      Social History     Socioeconomic History    Marital status:       Spouse name: Not on file    Number of children: Not on file    Years of education: Not on file    Highest education level: Not on file   Occupational History    Not on file   Tobacco Use    Smoking status: Never    Smokeless tobacco: Never   Vaping Use    Vaping Use: Never used   Substance and Sexual Activity    Alcohol use: No    Drug use: No    Sexual activity: Not Currently   Other Topics Concern    Not on file   Social History Narrative    Not on file     Social Determinants of Health     Financial Resource Strain: Not on file   Food Insecurity: Not on file   Transportation Needs: Not on file   Physical Activity: Not on file   Stress: Not on file   Social Connections: Not on file   Intimate Partner Violence: Not on file   Housing Stability: Not on file       Current Outpatient Medications   Medication Sig Dispense Refill    losartan (COZAAR) 50 MG tablet Take 50 mg by mouth daily      lovastatin (MEVACOR) 40 MG tablet Take 40 mg by mouth nightly. fluticasone (FLONASE) 50 MCG/ACT nasal spray 1 spray by Nasal route daily. aspirin 81 MG tablet Take 81 mg by mouth daily. No current facility-administered medications for this visit. Allergies   Allergen Reactions    Sulfa Antibiotics Swelling and Rash       Review of Systems:       Review of Systems   Constitutional: Negative for chills. Negative for fever. HENT: Negative for congestion. Negative for rhinorrhea. Respiratory: Negative for cough. Negative for shortness of breath. Cardiovascular: Negative for chest pain. Gastrointestinal:  Negative for constipation. Negative for diarrhea. Negative for nausea and vomiting. Genitourinary: Negative for difficulty urinating. Neurological: Negative for dizziness, syncope and numbness. Hematological: Does not bruise/bleed easily. OBJECTIVE:  Physical Exam:    BP (!) 166/88 (Site: Left Upper Arm, Position: Sitting, Cuff Size: Medium Adult)   Pulse 86   Ht 5' 11\" (1.803 m)   Wt 160 lb (72.6 kg)   BMI 22.32 kg/m²      Physical Exam  General: awake, alert, in no acute distress  HEENT: mucous membranes moist  Respiratory: normal effort, no wheezes appreciated  CV: appears well perfused, regular rate and rhythm  Abdomen: Soft, non-tender, non-distended. No guarding or rebound tenderness. : left groin with moderate sized reducible hernia, non-tender    Skin: warm and dry  Extremities: atraumatic  Neuro: no focal deficits noted  Psych: mood normal        ASSESSMENT:  1. Left inguinal hernia    80 y.o. male with left inguinal hernia. It is symptomatic and he would like it fixed. We discussed open vs robotic repair.         PLAN:    - will plan for open left inguinal hernia repair in [facility]    Patient counseled on risks, benefits, and alternatives of treatment plan at length. Patient states anunderstanding and willingness to proceed with plan.     Preston Phillips MD  1/24/2023  12:00 PM

## 2023-01-27 ENCOUNTER — TELEPHONE (OUTPATIENT)
Dept: SURGERY | Age: 83
End: 2023-01-27

## 2023-01-27 NOTE — TELEPHONE ENCOUNTER
SPOKE TO Niki 40 OPEN LASHON SCHEDULED @ [facility].  NOTIFIED OF DATES, TIMES AND LOCATION    PHONE ASSESSMENT   SURGERY -02/07/23  P/O -02/21/23 [facility]    NPO AFTER MIDNIGHT  (ENTER OTHER PREP INFO)  HOLD BLOOD THINNERS - Y

## 2023-02-03 RX ORDER — UBIDECARENONE 75 MG
50 CAPSULE ORAL DAILY
COMMUNITY

## 2023-02-03 NOTE — PROGRESS NOTES
Surgery Date:   2/7/23                                 Arrive at: 0600  Surgery time: 0730     If your arrival time is before 7 AM come in the emergency room entrance. If after 7 AM come in the main entrance. Two visitors may accompany you to the hospital.               1. Do not eat or drink anything after midnight - unless instructed by your doctor prior to surgery. This includes                  no water, chewing gum or mints. 2. Follow your directions as prescribed by the doctor for your procedure and medications. Take the following medications with a small sip of water the morning of: none              3. Check with your Doctor regarding stopping Plavix, Coumadin, Lovenox, Effient, Pradaxa, Xarelto, Fragmin or other blood thinners and                  follow their instructions. 4. Do not smoke and do not drink any alcoholic beverages 24 hours prior to surgery. 5. You may brush your teeth and gargle the morning of surgery. DO NOT SWALLOW WATER  6. Please wear simple, loose fitting clothing to the hospital.  Jarochona Garth not bring valuables (money, credit cards, checkbooks, etc.) Do not wear any                  makeup (including no eye makeup) or nail polish on your fingers or toes. 7.  DO NOT wear any jewelry or piercings on day of surgery. All body piercing jewelry must be removed. 8.  Take a shower the night before or morning of your procedure, do not apply any lotion, oil or powder. 9. If you have dentures, they will be removed before going to the OR; we will provide you a container. If you wear contact lenses or glasses,                 they will be removed; please bring a case for them. 10. If you  have a Living Will and Durable Power of  for Healthcare, please bring in a copy. 11. Please bring picture ID,  insurance card, paperwork from the doctors office    (H & P, Consent, & card for implantable devices). 12. You MUST make arrangements for a responsible adult to take you home after your surgery and be able to check on you every couple                  hours for the day. You will not be allowed to leave alone or drive yourself home. It is strongly suggested someone stay with you the first 24                  hrs. Your surgery will be cancelled if you do not have a ride home. Please call [phone] with any questions.

## 2023-02-06 ENCOUNTER — ANESTHESIA EVENT (OUTPATIENT)
Dept: OPERATING ROOM | Age: 83
End: 2023-02-06
Payer: MEDICARE

## 2023-02-06 NOTE — ANESTHESIA PRE PROCEDURE
Department of Anesthesiology  Preprocedure Note       Name:  Herrera Alva   Age:  80 y.o.  :  1940                                          MRN:  [id number]         Date:  2023      Surgeon: Eliceo Horne):  Valencia Lauren MD    Procedure: Procedure(s): HERNIA INGUINAL REPAIR    Medications prior to admission:   Prior to Admission medications    Medication Sig Start Date End Date Taking? Authorizing Provider   vitamin B-12 (CYANOCOBALAMIN) 100 MCG tablet Take 50 mcg by mouth daily   Yes Historical Provider, MD   losartan (COZAAR) 50 MG tablet Take 50 mg by mouth daily 22   Historical Provider, MD   lovastatin (MEVACOR) 40 MG tablet Take 40 mg by mouth nightly. Historical Provider, MD   fluticasone (FLONASE) 50 MCG/ACT nasal spray 1 spray by Nasal route daily. Historical Provider, MD   aspirin 81 MG tablet Take 81 mg by mouth daily. Historical Provider, MD       Current medications:    No current facility-administered medications for this encounter. Current Outpatient Medications   Medication Sig Dispense Refill    vitamin B-12 (CYANOCOBALAMIN) 100 MCG tablet Take 50 mcg by mouth daily      losartan (COZAAR) 50 MG tablet Take 50 mg by mouth daily      lovastatin (MEVACOR) 40 MG tablet Take 40 mg by mouth nightly.  fluticasone (FLONASE) 50 MCG/ACT nasal spray 1 spray by Nasal route daily.  aspirin 81 MG tablet Take 81 mg by mouth daily. Allergies:     Allergies   Allergen Reactions    Sulfa Antibiotics Swelling and Rash       Problem List:    Patient Active Problem List   Diagnosis Code    Bright red rectal bleeding K62.5    Diverticulosis K57.90    Acute cholecystitis K81.0    Cholelithiasis K80.20    Epigastric abdominal pain R10.13    HLD (hyperlipidemia) E78.5    Essential hypertension I10       Past Medical History:        Diagnosis Date    Diverticulitis     Hyperlipidemia     Hypertension     Pneumothorax, right     Rectal bleeding     Seasonal allergic reaction        Past Surgical History:        Procedure Laterality Date    CHEST TUBE INSERTION      CHOLECYSTECTOMY, LAPAROSCOPIC N/A 12/18/2021    CHOLECYSTECTOMY LAPAROSCOPIC performed by Chelsi Ruiz MD at [place] [address]      as child       Social History:    Social History     Tobacco Use    Smoking status: Never    Smokeless tobacco: Never   Substance Use Topics    Alcohol use: No                                Counseling given: Not Answered      Vital Signs (Current):   Vitals:    02/03/23 0841   Weight: 160 lb (72.6 kg)   Height: 5' 11\" (1.803 m)                                              BP Readings from Last 3 Encounters:   01/24/23 (!) 166/88   01/12/22 (!) 144/92   12/18/21 123/78       NPO Status:                                                                                 BMI:   Wt Readings from Last 3 Encounters:   01/24/23 160 lb (72.6 kg)   01/12/22 161 lb 12.8 oz (73.4 kg)   12/17/21 156 lb (70.8 kg)     Body mass index is 22.32 kg/m².     CBC:   Lab Results   Component Value Date/Time    WBC 5.8 12/18/2021 06:21 AM    RBC 3.97 12/18/2021 06:21 AM    HGB 12.9 12/18/2021 06:21 AM    HCT 40.1 12/18/2021 06:21 AM    .0 12/18/2021 06:21 AM    RDW 12.7 12/18/2021 06:21 AM     12/18/2021 06:21 AM       CMP:   Lab Results   Component Value Date/Time     12/18/2021 06:21 AM    K 4.3 12/18/2021 06:21 AM     12/18/2021 06:21 AM    CO2 24 12/18/2021 06:21 AM    BUN 16 12/18/2021 06:21 AM    CREATININE 1.2 12/18/2021 06:21 AM    GFRAA >60 12/18/2021 06:21 AM    LABGLOM 58 12/18/2021 06:21 AM    GLUCOSE 97 12/18/2021 06:21 AM    PROT 5.8 12/18/2021 06:21 AM    CALCIUM 8.6 12/18/2021 06:21 AM    BILITOT 0.9 12/18/2021 06:21 AM    ALKPHOS 114 12/18/2021 06:21 AM     12/18/2021 06:21 AM     12/18/2021 06:21 AM       POC Tests: No results for input(s): POCGLU, POCNA, POCK, POCCL, POCBUN, POCHEMO, POCHCT in the last 72 hours.    Coags:   Lab Results   Component Value Date/Time    PROTIME 13.5 12/17/2021 08:00 AM    INR 1.09 12/17/2021 08:00 AM    APTT 28.6 08/10/2013 09:45 AM       HCG (If Applicable): No results found for: PREGTESTUR, PREGSERUM, HCG, HCGQUANT     ABGs: No results found for: PHART, PO2ART, CHG5TTP, TAE6JAK, BEART, L4WLBNVJ     Type & Screen (If Applicable):  No results found for: LABABO, LABRH    Drug/Infectious Status (If Applicable):  Lab Results   Component Value Date/Time    HEPCAB NON REACTIVE 12/16/2021 10:45 PM       COVID-19 Screening (If Applicable):   Lab Results   Component Value Date/Time    COVID19 NOT DETECTED 12/17/2021 01:00 AM           Anesthesia Evaluation    Airway:           Dental:          Pulmonary:                              Cardiovascular:    (+) hypertension:, hyperlipidemia                  Neuro/Psych:               GI/Hepatic/Renal:             Endo/Other:                     Abdominal:             Vascular:           Other Findings:           Anesthesia Plan      general     ASA 2     (Chart review)                              THIERNO Perez CRNA   2/6/2023

## 2023-02-07 ENCOUNTER — ANESTHESIA (OUTPATIENT)
Dept: OPERATING ROOM | Age: 83
End: 2023-02-07
Payer: MEDICARE

## 2023-02-07 ENCOUNTER — HOSPITAL ENCOUNTER (OUTPATIENT)
Age: 83
Setting detail: OUTPATIENT SURGERY
Discharge: HOME | End: 2023-02-07
Attending: SURGERY | Admitting: SURGERY
Payer: MEDICARE

## 2023-02-07 VITALS
TEMPERATURE: 96.9 F | SYSTOLIC BLOOD PRESSURE: 119 MMHG | WEIGHT: 160 LBS | RESPIRATION RATE: 16 BRPM | DIASTOLIC BLOOD PRESSURE: 69 MMHG | OXYGEN SATURATION: 95 % | HEART RATE: 69 BPM | HEIGHT: 71 IN | BODY MASS INDEX: 22.4 KG/M2

## 2023-02-07 DIAGNOSIS — K40.90 UNILATERAL INGUINAL HERNIA WITHOUT OBSTRUCTION OR GANGRENE, RECURRENCE NOT SPECIFIED: Primary | ICD-10-CM

## 2023-02-07 PROCEDURE — 7100000010 HC PHASE II RECOVERY - FIRST 15 MIN: Performed by: SURGERY

## 2023-02-07 PROCEDURE — 2500000003 HC RX 250 WO HCPCS

## 2023-02-07 PROCEDURE — C1781 MESH (IMPLANTABLE): HCPCS | Performed by: SURGERY

## 2023-02-07 PROCEDURE — 3600000014 HC SURGERY LEVEL 4 ADDTL 15MIN: Performed by: SURGERY

## 2023-02-07 PROCEDURE — 6360000002 HC RX W HCPCS: Performed by: SURGERY

## 2023-02-07 PROCEDURE — 2500000003 HC RX 250 WO HCPCS: Performed by: SURGERY

## 2023-02-07 PROCEDURE — 49505 PRP I/HERN INIT REDUC >5 YR: CPT | Performed by: SURGERY

## 2023-02-07 PROCEDURE — 2580000003 HC RX 258: Performed by: SURGERY

## 2023-02-07 PROCEDURE — 7100000001 HC PACU RECOVERY - ADDTL 15 MIN: Performed by: SURGERY

## 2023-02-07 PROCEDURE — 6360000002 HC RX W HCPCS

## 2023-02-07 PROCEDURE — 7100000011 HC PHASE II RECOVERY - ADDTL 15 MIN: Performed by: SURGERY

## 2023-02-07 PROCEDURE — 3600000004 HC SURGERY LEVEL 4 BASE: Performed by: SURGERY

## 2023-02-07 PROCEDURE — 7100000000 HC PACU RECOVERY - FIRST 15 MIN: Performed by: SURGERY

## 2023-02-07 PROCEDURE — 3700000001 HC ADD 15 MINUTES (ANESTHESIA): Performed by: SURGERY

## 2023-02-07 PROCEDURE — 3700000000 HC ANESTHESIA ATTENDED CARE: Performed by: SURGERY

## 2023-02-07 PROCEDURE — 2709999900 HC NON-CHARGEABLE SUPPLY: Performed by: SURGERY

## 2023-02-07 DEVICE — MESH HERN W7.5XL15CM INGUINAL POLYPR SYN FLAT L PORE MFIL: Type: IMPLANTABLE DEVICE | Site: GROIN | Status: FUNCTIONAL

## 2023-02-07 RX ORDER — FENTANYL CITRATE 50 UG/ML
INJECTION, SOLUTION INTRAMUSCULAR; INTRAVENOUS PRN
Status: DISCONTINUED | OUTPATIENT
Start: 2023-02-07 | End: 2023-02-07 | Stop reason: SDUPTHER

## 2023-02-07 RX ORDER — SODIUM CHLORIDE 0.9 % (FLUSH) 0.9 %
5-40 SYRINGE (ML) INJECTION PRN
Status: DISCONTINUED | OUTPATIENT
Start: 2023-02-07 | End: 2023-02-07 | Stop reason: HOSPADM

## 2023-02-07 RX ORDER — SODIUM CHLORIDE 0.9 % (FLUSH) 0.9 %
5-40 SYRINGE (ML) INJECTION EVERY 12 HOURS SCHEDULED
Status: DISCONTINUED | OUTPATIENT
Start: 2023-02-07 | End: 2023-02-07 | Stop reason: HOSPADM

## 2023-02-07 RX ORDER — ONDANSETRON 2 MG/ML
INJECTION INTRAMUSCULAR; INTRAVENOUS PRN
Status: DISCONTINUED | OUTPATIENT
Start: 2023-02-07 | End: 2023-02-07 | Stop reason: SDUPTHER

## 2023-02-07 RX ORDER — SODIUM CHLORIDE, SODIUM LACTATE, POTASSIUM CHLORIDE, CALCIUM CHLORIDE 600; 310; 30; 20 MG/100ML; MG/100ML; MG/100ML; MG/100ML
INJECTION, SOLUTION INTRAVENOUS CONTINUOUS
Status: DISCONTINUED | OUTPATIENT
Start: 2023-02-07 | End: 2023-02-07 | Stop reason: HOSPADM

## 2023-02-07 RX ORDER — DOCUSATE SODIUM 100 MG/1
100 CAPSULE, LIQUID FILLED ORAL 2 TIMES DAILY
Qty: 60 CAPSULE | Refills: 0 | COMMUNITY
Start: 2023-02-07 | End: 2023-02-21

## 2023-02-07 RX ORDER — PHENYLEPHRINE HCL IN 0.9% NACL 1 MG/10 ML
SYRINGE (ML) INTRAVENOUS PRN
Status: DISCONTINUED | OUTPATIENT
Start: 2023-02-07 | End: 2023-02-07 | Stop reason: SDUPTHER

## 2023-02-07 RX ORDER — SODIUM CHLORIDE 9 MG/ML
INJECTION, SOLUTION INTRAVENOUS PRN
Status: CANCELLED | OUTPATIENT
Start: 2023-02-07

## 2023-02-07 RX ORDER — LIDOCAINE HYDROCHLORIDE 20 MG/ML
INJECTION, SOLUTION EPIDURAL; INFILTRATION; INTRACAUDAL; PERINEURAL PRN
Status: DISCONTINUED | OUTPATIENT
Start: 2023-02-07 | End: 2023-02-07 | Stop reason: SDUPTHER

## 2023-02-07 RX ORDER — SODIUM CHLORIDE 9 MG/ML
INJECTION, SOLUTION INTRAVENOUS PRN
Status: DISCONTINUED | OUTPATIENT
Start: 2023-02-07 | End: 2023-02-07 | Stop reason: HOSPADM

## 2023-02-07 RX ORDER — SODIUM CHLORIDE 0.9 % (FLUSH) 0.9 %
5-40 SYRINGE (ML) INJECTION EVERY 12 HOURS SCHEDULED
Status: CANCELLED | OUTPATIENT
Start: 2023-02-07

## 2023-02-07 RX ORDER — ONDANSETRON 4 MG/1
4 TABLET, FILM COATED ORAL EVERY 6 HOURS PRN
Qty: 10 TABLET | Refills: 0 | Status: SHIPPED | OUTPATIENT
Start: 2023-02-07

## 2023-02-07 RX ORDER — OXYCODONE HYDROCHLORIDE AND ACETAMINOPHEN 5; 325 MG/1; MG/1
1 TABLET ORAL EVERY 6 HOURS PRN
Qty: 14 TABLET | Refills: 0 | Status: SHIPPED | OUTPATIENT
Start: 2023-02-07 | End: 2023-02-12

## 2023-02-07 RX ORDER — DEXAMETHASONE SODIUM PHOSPHATE 4 MG/ML
INJECTION, SOLUTION INTRA-ARTICULAR; INTRALESIONAL; INTRAMUSCULAR; INTRAVENOUS; SOFT TISSUE PRN
Status: DISCONTINUED | OUTPATIENT
Start: 2023-02-07 | End: 2023-02-07 | Stop reason: SDUPTHER

## 2023-02-07 RX ORDER — ACETAMINOPHEN 325 MG/1
650 TABLET ORAL
Status: CANCELLED | OUTPATIENT
Start: 2023-02-07 | End: 2023-02-08

## 2023-02-07 RX ORDER — SODIUM CHLORIDE 0.9 % (FLUSH) 0.9 %
5-40 SYRINGE (ML) INJECTION PRN
Status: CANCELLED | OUTPATIENT
Start: 2023-02-07

## 2023-02-07 RX ORDER — PROPOFOL 10 MG/ML
INJECTION, EMULSION INTRAVENOUS PRN
Status: DISCONTINUED | OUTPATIENT
Start: 2023-02-07 | End: 2023-02-07 | Stop reason: SDUPTHER

## 2023-02-07 RX ORDER — GLYCOPYRROLATE 0.2 MG/ML
INJECTION INTRAMUSCULAR; INTRAVENOUS PRN
Status: DISCONTINUED | OUTPATIENT
Start: 2023-02-07 | End: 2023-02-07 | Stop reason: SDUPTHER

## 2023-02-07 RX ORDER — ROCURONIUM BROMIDE 10 MG/ML
INJECTION, SOLUTION INTRAVENOUS PRN
Status: DISCONTINUED | OUTPATIENT
Start: 2023-02-07 | End: 2023-02-07 | Stop reason: SDUPTHER

## 2023-02-07 RX ORDER — BUPIVACAINE HYDROCHLORIDE 5 MG/ML
INJECTION, SOLUTION EPIDURAL; INTRACAUDAL
Status: COMPLETED | OUTPATIENT
Start: 2023-02-07 | End: 2023-02-07

## 2023-02-07 RX ADMIN — DEXAMETHASONE SODIUM PHOSPHATE 4 MG: 4 INJECTION, SOLUTION INTRAMUSCULAR; INTRAVENOUS at 08:11

## 2023-02-07 RX ADMIN — Medication 100 MCG: at 08:19

## 2023-02-07 RX ADMIN — Medication 100 MCG: at 08:15

## 2023-02-07 RX ADMIN — Medication 50 MCG: at 08:49

## 2023-02-07 RX ADMIN — FENTANYL CITRATE 50 MCG: 50 INJECTION INTRAMUSCULAR; INTRAVENOUS at 08:00

## 2023-02-07 RX ADMIN — SODIUM CHLORIDE, POTASSIUM CHLORIDE, SODIUM LACTATE AND CALCIUM CHLORIDE: 600; 310; 30; 20 INJECTION, SOLUTION INTRAVENOUS at 06:26

## 2023-02-07 RX ADMIN — FENTANYL CITRATE 25 MCG: 50 INJECTION INTRAMUSCULAR; INTRAVENOUS at 08:43

## 2023-02-07 RX ADMIN — CEFAZOLIN 2000 MG: 2 INJECTION, POWDER, FOR SOLUTION INTRAMUSCULAR; INTRAVENOUS at 08:10

## 2023-02-07 RX ADMIN — LIDOCAINE HYDROCHLORIDE 100 MG: 20 INJECTION, SOLUTION EPIDURAL; INFILTRATION; INTRACAUDAL; PERINEURAL at 08:04

## 2023-02-07 RX ADMIN — SUGAMMADEX 150 MG: 100 INJECTION, SOLUTION INTRAVENOUS at 09:00

## 2023-02-07 RX ADMIN — PROPOFOL 120 MG: 10 INJECTION, EMULSION INTRAVENOUS at 08:04

## 2023-02-07 RX ADMIN — GLYCOPYRROLATE 0.2 MG: 0.2 INJECTION INTRAMUSCULAR; INTRAVENOUS at 08:55

## 2023-02-07 RX ADMIN — ROCURONIUM BROMIDE 50 MG: 50 INJECTION INTRAVENOUS at 08:07

## 2023-02-07 RX ADMIN — ONDANSETRON 4 MG: 2 INJECTION INTRAMUSCULAR; INTRAVENOUS at 08:42

## 2023-02-07 RX ADMIN — Medication 50 MCG: at 08:55

## 2023-02-07 RX ADMIN — FENTANYL CITRATE 25 MCG: 50 INJECTION INTRAMUSCULAR; INTRAVENOUS at 08:29

## 2023-02-07 RX ADMIN — Medication 150 MCG: at 08:23

## 2023-02-07 RX ADMIN — Medication 100 MCG: at 08:46

## 2023-02-07 ASSESSMENT — PAIN - FUNCTIONAL ASSESSMENT: PAIN_FUNCTIONAL_ASSESSMENT: 0-10

## 2023-02-07 ASSESSMENT — PAIN SCALES - GENERAL
PAINLEVEL_OUTOF10: 0

## 2023-02-07 NOTE — PROGRESS NOTES
Patient remains A/O x4. VS stable. Incision to left groin remains D/I. Patient drank peg mist and ate saltines without c/o nausea or vomiting. Patient ready for discharge home. Verbal and written discharge instructions reviewed with patient and sister in law. They signed and received copies after voicing understanding to all. Patient instructed to call MD's office with any questions that arise. Patient voices understanding.

## 2023-02-07 NOTE — PROGRESS NOTES
Patient returned to room from PACU. Bed brakes applied and VS obtained. See flow sheet. Patient c/o mild nausea. Incision to left groin dry/intact with no drainage. Sister in law at bedside. Will continue to monitor. Call light in reach.

## 2023-02-07 NOTE — OP NOTE
Operative Note      Patient: Nelsy Matt  YOB: 1940  MRN: [id number]    Date of Procedure: 2/7/2023    Pre-Op Diagnosis: LEFT INGUINAL HERNIA    Post-Op Diagnosis: Same       Procedure(s): HERNIA INGUINAL REPAIR WITH MESH    Surgeon(s):  Kelly Dillon MD    Assistant:   * No surgical staff found *    Anesthesia: General    Estimated Blood Loss (mL): Minimal    Complications: None    Specimens:   * No specimens in log *    Implants:  Implant Name Type Inv. Item Serial No.  Lot No. LRB No. Used Action   MESH SOM [id number] INGUINAL POLYPR SYN FLAT L PORE MFIL - [id number]  MESH SOM [id number] INGUINAL POLYPR SYN FLAT L PORE MFIL  BARD DAVOL-WD [id number] Left 1 Implanted         Drains: * No LDAs found *    Findings: moderate sized indirect inguinal hernia    Detailed Description of Procedure: Indication:  The above mentioned patient was seen pre-operative in clinic with symptomatic complaints of left inguinal hernia. The patient desired operative repair. At that time, the procedure--including risks, benefits, and alternatives--were discussed in detail with the patient. The patient agreed to proceed. Operative Details:    After informed consent, risks and benefits of the procedure were explained to the patient and the patient was brought to the operative room. Once in the operating room, the patient was transferred onto the operating room table and secured in multiple locations with attention to pad all pressure points. Sequential compression devices were applied and operating throughout the case. General anesthesia was induced without complication. The patient's lower abdomen and groin were prepped and draped in the normal sterile fashion. An approved timeout was held with all members of the operating room team present and in agreement. The patient received IV antibiotics in accordance with national protocol.      The inguinal ligament was identified from the pubic tubercle to the ASIS>     The skin was anesthetized with local anesthesia approximately one fingerbreadth medial and inferior to the ASIS and along the planned transverse groin incision. An incision was made with a #15 blade scalpel approximately 6 cm in length. The dissection was carried down through the subcutaneous tissue and Marly's fascia with electrocautery with careful attention to hemostasis. The external oblique aponeurosis was identified. It was incised with scalpel and the opening was extended in both directions with Metzenbaum scissors to the external ring with careful attention to identification and protection of the nerve. Each side of the external oblique aponeurosis was grasped with mosquito forceps. The cord, cord structures, and hernia sac were freed up circumferentially and a Penrose drain was placed around it. The hernia sac was identified and the anteromedial portion of the sac was stripped down to its base and returned to the peritoneal cavity. A polypropylene mesh was then secured in place with #2-0 Prolene. A slit was made in the lateral portion of the mesh to allow the cord structures to pass through. The mesh was sutured to the pubic tubercle medially and then along the ilioinguinal ligament inferiorly with #2-0 Prolene. The superior portion of the mesh was secured appropriately. The tails of the mesh were joined with 2-0 prolene suture to close the slit lateral to the cord structures. The external oblique was closed with a running #2-0 Vicryl taking care not to strangulate the cord, and to recreate the external ring. Marly's fascia was re-approximated with absorbable suture. The skin was closed in a running subcuticular #4-0 Monocryl suture. The site was cleaned and dried and appropriate surgical dressing applied. The patient tolerated the procedure well and was transferred to recovery in stable condition.     At the end of the case, the counts were correct times two.         Electronically signed by Clifton Flores MD on 2/7/2023 at 9:07 AM

## 2023-02-07 NOTE — H&P
H&P  CC: left inguinal hernia      SUBJECTIVE:  HPI:   1/24/23  Patient presents for evaluation of left inguinal hernia. Symptoms were first noted several months ago. Pain is intermittent . Lump is reducible. Pt denies obstructive symptoms. Pt with previous history of abdominal surgery. I performed laparoscopic cholecystectomy on him in the past.     2/7/2023  Seen and examined again today. Here for left inguinal hernia repair. Denies changes in their health since last seen. Denies questions regarding surgery today. Past Surgical History:   Procedure Laterality Date    CHEST TUBE INSERTION      CHOLECYSTECTOMY, LAPAROSCOPIC N/A 12/18/2021    CHOLECYSTECTOMY LAPAROSCOPIC performed by Chantal Shepard MD at [address]      as child     Past Medical History:   Diagnosis Date    Diverticulitis     Hyperlipidemia     Hypertension     Pneumothorax, right     Rectal bleeding     Seasonal allergic reaction      Family History   Problem Relation Age of Onset    Heart Disease Mother     Cancer Mother     Diabetes Father     Diabetes Brother     Cancer Sister      Social History     Socioeconomic History    Marital status:       Spouse name: Not on file    Number of children: Not on file    Years of education: Not on file    Highest education level: Not on file   Occupational History    Not on file   Tobacco Use    Smoking status: Never    Smokeless tobacco: Never   Vaping Use    Vaping Use: Never used   Substance and Sexual Activity    Alcohol use: No    Drug use: No    Sexual activity: Not Currently   Other Topics Concern    Not on file   Social History Narrative    Not on file     Social Determinants of Health     Financial Resource Strain: Not on file   Food Insecurity: Not on file   Transportation Needs: Not on file   Physical Activity: Not on file   Stress: Not on file   Social Connections: Not on file   Intimate Partner Violence: Not on file   Housing Stability: Not on file       Current Facility-Administered Medications   Medication Dose Route Frequency Provider Last Rate Last Admin    sodium chloride flush 0.9 % injection 5-40 mL  5-40 mL IntraVENous 2 times per day Linh Goodman MD        sodium chloride flush 0.9 % injection 5-40 mL  5-40 mL IntraVENous PRN Linh Goodman MD        0.9 % sodium chloride infusion   IntraVENous PRN Linh Goodman MD        ceFAZolin (ANCEF) 2,000 mg in sodium chloride 0.9 % 50 mL IVPB (mini-bag)  2,000 mg IntraVENous On Call to Luis Angel Mercedes MD        lactated ringers IV soln infusion   IntraVENous Continuous Linh Goodman  mL/hr at 02/07/23 0626 New Bag at 02/07/23 3892      Allergies   Allergen Reactions    Sulfa Antibiotics Swelling and Rash       Review of Systems:       Review of Systems   Constitutional: Negative for chills. Negative for fever. HENT: Negative for congestion. Negative for rhinorrhea. Respiratory: Negative for cough. Negative for shortness of breath. Cardiovascular: Negative for chest pain. Gastrointestinal:  Negative for constipation. Negative for diarrhea. Negative for nausea and vomiting. Genitourinary: Negative for difficulty urinating. Neurological: Negative for dizziness, syncope and numbness. Hematological: Does not bruise/bleed easily. OBJECTIVE:  Physical Exam:    BP (!) 142/88   Pulse 76   Temp 98.4 °F (36.9 °C) (Temporal)   Resp 16   Ht 5' 11\" (1.803 m)   Wt 160 lb (72.6 kg)   SpO2 98%   BMI 22.32 kg/m²      Physical Exam  General: awake, alert, in no acute distress  HEENT: mucous membranes moist  Respiratory: normal effort, no wheezes appreciated  CV: appears well perfused, regular rate and rhythm  Abdomen: Soft, non-tender, non-distended. No guarding or rebound tenderness.     : left groin with moderate sized reducible hernia, non-tender    Skin: warm and dry  Extremities: atraumatic  Neuro: no focal deficits noted  Psych: mood normal        ASSESSMENT:  80 y.o. male with left inguinal hernia. It is symptomatic and he would like it fixed. PLAN:    - will plan for open left inguinal hernia repair    Patient counseled on risks, benefits, and alternatives of treatment plan at length. Patient states anunderstanding and willingness to proceed with plan.     Jestine Goltz, MD  2/7/2023  7:51 AM

## 2023-02-07 NOTE — ANESTHESIA POSTPROCEDURE EVALUATION
Department of Anesthesiology  Postprocedure Note    Patient: Vianca Alvarado  MRN: [id number]  YOB: 1940  Date of evaluation: 2/7/2023      Procedure Summary     Date: 02/07/23 Room / Location: [address]    Anesthesia Start: 3496 Anesthesia Stop: 7633    Procedure: HERNIA INGUINAL REPAIR WITH MESH (Left: Groin) Diagnosis:       Non-recurrent unilateral inguinal hernia without obstruction or gangrene      (INGUINAL HERNIA)    Surgeons: Chelsi Ruiz MD Responsible Provider: THIERNO Marion Ma, CRNA    Anesthesia Type: general ASA Status: 2          Anesthesia Type: No value filed.     Cassia Phase I: Cassia Score: 8    Cassia Phase II:        Anesthesia Post Evaluation    Patient location during evaluation: PACU  Patient participation: complete - patient participated  Level of consciousness: awake and alert  Pain score: 1  Airway patency: patent  Nausea & Vomiting: no nausea and no vomiting  Complications: no  Cardiovascular status: hemodynamically stable  Respiratory status: acceptable, spontaneous ventilation and face mask  Hydration status: stable

## 2023-02-07 NOTE — DISCHARGE INSTRUCTIONS
Discharge Instructions for Abdominal Surgery -- Dr Moran Patient    [address]  [place], [address] [facility]    [address], [address]  [address]    [phone]:      BATHING: OK to shower the day following surgery. No bath tub or submerging incision under water for 1 week. Wound:  Keep wound dry and clean. May shower as instructed above. Dressing:  no need for dressing, your wound is dressed with a surgical glue that will wear off in 1-2 weeks. DRIVING: No driving until off narcotic pain medications and walking comfortably    RETURN TO WORK: when tolerated with lifting restrictions as below    WALKING:  As tolerated     STAIRS:  As tolerated    Physical Activity    No lifting greater than 10 lbs for the first 2 weeks. Then 20 lbs for weeks 3-4 after surgery and 40 lbs for weeks 5-6 after surgery. No restrictions after 6 weeks. Ask the doctor when you can return to normal activities. Diet    Some pain medicine can cause constipation . To avoid this problem:   Drink plenty of fluids. Eat foods high in fiber , such as:   Whole grain cereals and breads   Fruits and vegetables   Legumes (eg, beans, lentils)     Constipation  You should also take an over the counter stool softener while using prescribed pain medication to avoid constipation. Colace (docusate sodium) or milk of magnesia are good stool softeners that you can find at a pharmacy or grocery store. Medications     Take your pain medications as instructed. Resume your home medications as instructed. Lifestyle Changes   You and your doctor will plan lifestyle changes that will aid in recovery. If you smoke, your doctor may recommend that you quit . Smoking can cause chronic cough , a risk factor for this condition. Prevention   To prevent hernias from ocurring:   Maintain a healthy weight . Strengthen abdominal muscles. Avoid heavy lifting.    Get treated for chronic conditions, like constipation, allergies, or chronic coughing. Follow-up   The doctor will monitor this condition. You may need more exams. Go to all of your appointments. Call Dr Noelle White at [phone]  If Any of the Following Occurs   After you leave the hospital, call your doctor if any of the following occurs:   Pain that worsens   Other new symptoms   Signs of infection, including fever and chills   Nausea and/or vomiting that you can't control with the medications you were given   Pain that you can't control with the medications you've been given   Pain, burning, urgency or frequency of urination, or persistent bleeding in the urine   Excessive tenderness or swelling   Changes in bowel or sexual function   Dizziness or lightheadedness   Rash or hives   Call 911 or go to the emergency room immediately if any of the following occurs:   Cough, shortness of breath, or chest pain   Rapid, irregular heartbeat; chest pain   If you think you have an emergency    [address] with COVID-19 may have no symptoms, mild symptoms, such as fever, cough, and shortness of breath or they may have more severe illness, developing severe and fatal pneumonia. As a result, Advance Care Planning with attention to naming a health care decision maker (someone you trust to make healthcare decisions for you if you could not speak for yourself) and sharing other health care preferences is important BEFORE a possible health crisis. Please contact your Primary Care Provider to discuss Advance Care Planning.     Preventing the Spread of Coronavirus Disease 2019 in Homes and Residential Communities  For the most recent information go to [employer]    Prevention steps for People with confirmed or suspected COVID-19 (including persons under investigation) who do not need to be hospitalized  and   People with confirmed COVID-19 who were hospitalized and determined to be medically stable to go home    Your healthcare provider and public health staff will evaluate whether you can be cared for at home. If it is determined that you do not need to be hospitalized and can be isolated at home, you will be monitored by staff from your local or state health department. You should follow the prevention steps below until a healthcare provider or local or state health department says you can return to your normal activities. Stay home except to get medical care  People who are mildly ill with COVID-19 are able to isolate at home during their illness. You should restrict activities outside your home, except for getting medical care. Do not go to work, school, or public areas. Avoid using public transportation, ride-sharing, or taxis. Separate yourself from other people and animals in your home  People: As much as possible, you should stay in a specific room and away from other people in your home. Also, you should use a separate bathroom, if available. Animals: You should restrict contact with pets and other animals while you are sick with COVID-19, just like you would around other people. Although there have not been reports of pets or other animals becoming sick with COVID-19, it is still recommended that people sick with COVID-19 limit contact with animals until more information is known about the virus. When possible, have another member of your household care for your animals while you are sick. If you are sick with COVID-19, avoid contact with your pet, including petting, snuggling, being kissed or licked, and sharing food. If you must care for your pet or be around animals while you are sick, wash your hands before and after you interact with pets and wear a facemask. Call ahead before visiting your doctor  If you have a medical appointment, call the healthcare provider and tell them that you have or may have COVID-19.  This will help the healthcare providers office take steps to keep other people from getting infected or exposed. Wear a facemask  You should wear a facemask when you are around other people (e.g., sharing a room or vehicle) or pets and before you enter a healthcare providers office. If you are not able to wear a facemask (for example, because it causes trouble breathing), then people who live with you should not stay in the same room with you, or they should wear a facemask if they enter your room. Cover your coughs and sneezes  Cover your mouth and nose with a tissue when you cough or sneeze. Throw used tissues in a lined trash can. Immediately wash your hands with soap and water for at least 20 seconds or, if soap and water are not available, clean your hands with an alcohol-based hand  that contains at least 60% alcohol. Clean your hands often  Wash your hands often with soap and water for at least 20 seconds, especially after blowing your nose, coughing, or sneezing; going to the bathroom; and before eating or preparing food. If soap and water are not readily available, use an alcohol-based hand  with at least 60% alcohol, covering all surfaces of your hands and rubbing them together until they feel dry. Soap and water are the best option if hands are visibly dirty. Avoid touching your eyes, nose, and mouth with unwashed hands. Avoid sharing personal household items  You should not share dishes, drinking glasses, cups, eating utensils, towels, or bedding with other people or pets in your home. After using these items, they should be washed thoroughly with soap and water. Clean all high-touch surfaces everyday  High touch surfaces include counters, tabletops, doorknobs, bathroom fixtures, toilets, phones, keyboards, tablets, and bedside tables. Also, clean any surfaces that may have blood, stool, or body fluids on them. Use a household cleaning spray or wipe, according to the label instructions.  Labels contain instructions for safe and effective use of the cleaning product including precautions you should take when applying the product, such as wearing gloves and making sure you have good ventilation during use of the product. Monitor your symptoms  Seek prompt medical attention if your illness is worsening (e.g., difficulty breathing). Before seeking care, call your healthcare provider and tell them that you have, or are being evaluated for, COVID-19. Put on a facemask before you enter the facility. These steps will help the healthcare providers office to keep other people in the office or waiting room from getting infected or exposed. Ask your healthcare provider to call the local or state health department. Persons who are placed under active monitoring or facilitated self-monitoring should follow instructions provided by their local health department or occupational health professionals, as appropriate. When working with your local health department check their available hours. If you have a medical emergency and need to call 911, notify the dispatch personnel that you have, or are being evaluated for COVID-19. If possible, put on a facemask before emergency medical services arrive. Discontinuing home isolation  Patients with confirmed COVID-19 should remain under home isolation precautions until the risk of secondary transmission to others is thought to be low. The decision to discontinue home isolation precautions should be made on a case-by-case basis, in consultation with healthcare providers and state and [facility] health departments. [facility] in [address] (Same Day Surgery)    Do not drive, work around [facility] [address] or use equipment. Do not drink any alcoholic beverages. Do not smoke while alone. Avoid making important decisions. Plan to spend a quiet, relaxed evening @ home. Resume normal activities as you begin to feel better.   Eat lightly for your first meal, then gradually increase your diet to what is normal for you. In case of nausea, avoid food and drink only clear liquids. Resume food as nausea ceases. Notify your surgeon if you experience fever, chills, large amount of bleeding, difficulty breathing, persistent nausea and vomiting or any other disturbing problem. Call for a follow-up appointment with your surgeon.

## 2023-02-07 NOTE — ANESTHESIA PRE PROCEDURE
Department of Anesthesiology  Preprocedure Note       Name:  Nelsy Call   Age:  80 y.o.  :  1940                                          MRN:  [id number]         Date:  2023      Surgeon: Ny Arias):  Kelly Dillon MD    Procedure: Procedure(s): HERNIA INGUINAL REPAIR    Medications prior to admission:   Prior to Admission medications    Medication Sig Start Date End Date Taking? Authorizing Provider   vitamin B-12 (CYANOCOBALAMIN) 100 MCG tablet Take 50 mcg by mouth daily    Historical Provider, MD   losartan (COZAAR) 50 MG tablet Take 50 mg by mouth daily 22   Historical Provider, MD   lovastatin (MEVACOR) 40 MG tablet Take 40 mg by mouth nightly. Historical Provider, MD   fluticasone (FLONASE) 50 MCG/ACT nasal spray 1 spray by Nasal route daily. Historical Provider, MD   aspirin 81 MG tablet Take 81 mg by mouth daily. Historical Provider, MD       Current medications:    No current facility-administered medications for this visit. No current outpatient medications on file. Facility-Administered Medications Ordered in Other Visits   Medication Dose Route Frequency Provider Last Rate Last Admin    sodium chloride flush 0.9 % injection 5-40 mL  5-40 mL IntraVENous 2 times per day Kelly Dillon MD        sodium chloride flush 0.9 % injection 5-40 mL  5-40 mL IntraVENous PRN Kelly Dillon MD        0.9 % sodium chloride infusion   IntraVENous PRN Kelly Dillon MD        ceFAZolin (ANCEF) 2,000 mg in sodium chloride 0.9 % 50 mL IVPB (mini-bag)  2,000 mg IntraVENous On Call to Luis Angel Mercedes MD        lactated ringers IV soln infusion   IntraVENous Continuous Kelly Dillon  mL/hr at 23 0626 New Bag at 23 [id number]       Allergies:     Allergies   Allergen Reactions    Sulfa Antibiotics Swelling and Rash       Problem List:    Patient Active Problem List   Diagnosis Code    Bright red rectal bleeding K62.5    Diverticulosis K57.90    Acute cholecystitis K81.0    Cholelithiasis K80.20    Epigastric abdominal pain R10.13    HLD (hyperlipidemia) E78.5    Essential hypertension I10       Past Medical History:        Diagnosis Date    Diverticulitis     Hyperlipidemia     Hypertension     Pneumothorax, right     Rectal bleeding     Seasonal allergic reaction        Past Surgical History:        Procedure Laterality Date    CHEST TUBE INSERTION      CHOLECYSTECTOMY, LAPAROSCOPIC N/A 12/18/2021    CHOLECYSTECTOMY LAPAROSCOPIC performed by Cricket Slater MD at [place] [address]      as child       Social History:    Social History     Tobacco Use    Smoking status: Never    Smokeless tobacco: Never   Substance Use Topics    Alcohol use: No                                Counseling given: Not Answered      Vital Signs (Current): There were no vitals filed for this visit.                                            BP Readings from Last 3 Encounters:   02/07/23 (!) 142/88   01/24/23 (!) 166/88   01/12/22 (!) 144/92       NPO Status:                                                                                 BMI:   Wt Readings from Last 3 Encounters:   02/03/23 160 lb (72.6 kg)   01/24/23 160 lb (72.6 kg)   01/12/22 161 lb 12.8 oz (73.4 kg)     There is no height or weight on file to calculate BMI.    CBC:   Lab Results   Component Value Date/Time    WBC 5.8 12/18/2021 06:21 AM    RBC 3.97 12/18/2021 06:21 AM    HGB 12.9 12/18/2021 06:21 AM    HCT 40.1 12/18/2021 06:21 AM    .0 12/18/2021 06:21 AM    RDW 12.7 12/18/2021 06:21 AM     12/18/2021 06:21 AM       CMP:   Lab Results   Component Value Date/Time     12/18/2021 06:21 AM    K 4.3 12/18/2021 06:21 AM     12/18/2021 06:21 AM    CO2 24 12/18/2021 06:21 AM    BUN 16 12/18/2021 06:21 AM    CREATININE 1.2 12/18/2021 06:21 AM    GFRAA >60 12/18/2021 06:21 AM    LABGLOM 58 12/18/2021 06:21 AM    GLUCOSE 97 12/18/2021 06:21 AM    PROT 5.8 12/18/2021 06:21 AM    CALCIUM 8.6 12/18/2021 06:21 AM    BILITOT 0.9 12/18/2021 06:21 AM    ALKPHOS 114 12/18/2021 06:21 AM     12/18/2021 06:21 AM     12/18/2021 06:21 AM       POC Tests: No results for input(s): POCGLU, POCNA, POCK, POCCL, POCBUN, POCHEMO, POCHCT in the last 72 hours. Coags:   Lab Results   Component Value Date/Time    PROTIME 13.5 12/17/2021 08:00 AM    INR 1.09 12/17/2021 08:00 AM    APTT 28.6 08/10/2013 09:45 AM       HCG (If Applicable): No results found for: PREGTESTUR, PREGSERUM, HCG, HCGQUANT     ABGs: No results found for: PHART, PO2ART, FVE1KGB, GPZ2LYN, BEART, N4RMFJPY     Type & Screen (If Applicable):  No results found for: LABABO, LABRH    Drug/Infectious Status (If Applicable):  Lab Results   Component Value Date/Time    HEPCAB NON REACTIVE 12/16/2021 10:45 PM       COVID-19 Screening (If Applicable):   Lab Results   Component Value Date/Time    COVID19 NOT DETECTED 12/17/2021 01:00 AM           Anesthesia Evaluation    Airway: Mallampati: III  TM distance: <3 FB   Neck ROM: full  Mouth opening: > = 3 FB   Dental: normal exam         Pulmonary:Negative Pulmonary ROS                              Cardiovascular:    (+) hypertension:, hyperlipidemia      ECG reviewed               Beta Blocker:  Not on Beta Blocker         Neuro/Psych:               GI/Hepatic/Renal: Neg GI/Hepatic/Renal ROS            Endo/Other: Negative Endo/Other ROS                    Abdominal:             Vascular: negative vascular ROS. Other Findings:             Anesthesia Plan      general     ASA 2       Induction: intravenous. MIPS: Postoperative opioids intended and Prophylactic antiemetics administered. Anesthetic plan and risks discussed with patient.       Plan discussed with surgical team.                    THIERNO Yen - CRNA   2/7/2023

## 2023-02-07 NOTE — PROGRESS NOTES
0908 Pt arrived to PACU. Monitors applied and alarms set. Report received. 0242 Taking ice chips. Denies pain at present. 0940. Transferred to [facility].   Report given to [employer]

## 2023-02-08 ENCOUNTER — NURSE ONLY (OUTPATIENT)
Dept: SURGERY | Age: 83
End: 2023-02-08

## 2023-02-08 DIAGNOSIS — Z09 POSTOP CHECK: Primary | ICD-10-CM

## 2023-02-08 PROCEDURE — 99024 POSTOP FOLLOW-UP VISIT: CPT | Performed by: SURGERY

## 2023-02-08 NOTE — PROGRESS NOTES
Called Vianca Alvarado to see how they were doing post-operatively. C/O of some tenderness. Reports drinking plenty of fluids, is pushing 7UP and Sprite - urine is clear. Incision sites are healing well and denies any erythema or drainage. There is skin glue present  Educated on post operative care. Pain medication regimen Percocet and denies any nausea or vomiting currently. Had some nausea yesterday after surgery. States that he took a nausea pill and that it helped. Has not had any BMs yet, is taking stool softeners. Recommended to stay active, up at least every hour,  And to push plenty of fluids to help with BMs. Encouraged exercise and getting up and moving around at least every hour to prevent pneumonia/DVT's. Confirmed post op appointment with Dr. Rad Orellana and aware to call with any questions or concerns.

## 2023-02-21 ENCOUNTER — OFFICE VISIT (OUTPATIENT)
Dept: SURGERY | Age: 83
End: 2023-02-21

## 2023-02-21 VITALS — WEIGHT: 160 LBS | OXYGEN SATURATION: 99 % | HEIGHT: 71 IN | HEART RATE: 74 BPM | BODY MASS INDEX: 22.4 KG/M2

## 2023-02-21 DIAGNOSIS — Z09 POSTOPERATIVE EXAMINATION: Primary | ICD-10-CM

## 2023-02-21 PROCEDURE — 99024 POSTOP FOLLOW-UP VISIT: CPT | Performed by: SURGERY

## 2023-02-21 ASSESSMENT — PATIENT HEALTH QUESTIONNAIRE - PHQ9
SUM OF ALL RESPONSES TO PHQ QUESTIONS 1-9: 0
2. FEELING DOWN, DEPRESSED OR HOPELESS: 0
SUM OF ALL RESPONSES TO PHQ9 QUESTIONS 1 & 2: 0
1. LITTLE INTEREST OR PLEASURE IN DOING THINGS: 0
SUM OF ALL RESPONSES TO PHQ QUESTIONS 1-9: 0

## 2023-02-21 NOTE — PROGRESS NOTES
Post-Operative Clinic Note    Chief Complaint   Patient presents with    Post-Op Check     1st PO Open [facility] @ [facility] 2/7/23 (s/P 5/18/23)         SUBJECTIVE:  Patient is here today for a post-operative visit.     Patient is s/p left inguinal hernia repair.  He denies pain or complaints.     Past Surgical History:   Procedure Laterality Date    CHEST TUBE INSERTION      CHOLECYSTECTOMY, LAPAROSCOPIC N/A 12/18/2021    CHOLECYSTECTOMY LAPAROSCOPIC performed by Robert Potts MD at [facility] OR    COLONOSCOPY      HERNIA REPAIR Left 2/7/2023    HERNIA INGUINAL REPAIR WITH MESH performed by Robert Potts MD at [facility] OR    TONSILLECTOMY      as child     Past Medical History:   Diagnosis Date    Diverticulitis     Hyperlipidemia     Hypertension     Pneumothorax, right     Rectal bleeding     Seasonal allergic reaction      Family History   Problem Relation Age of Onset    Heart Disease Mother     Cancer Mother     Diabetes Father     Diabetes Brother     Cancer Sister      Social History     Socioeconomic History    Marital status:      Spouse name: Not on file    Number of children: Not on file    Years of education: Not on file    Highest education level: Not on file   Occupational History    Not on file   Tobacco Use    Smoking status: Never    Smokeless tobacco: Never   Vaping Use    Vaping Use: Never used   Substance and Sexual Activity    Alcohol use: No    Drug use: No    Sexual activity: Not Currently   Other Topics Concern    Not on file   Social History Narrative    Not on file     Social Determinants of Health     Financial Resource Strain: Not on file   Food Insecurity: Not on file   Transportation Needs: Not on file   Physical Activity: Not on file   Stress: Not on file   Social Connections: Not on file   Intimate Partner Violence: Not on file   Housing Stability: Not on file       OBJECTIVE:  Physical Exam  General: awake, alert, in no acute distress  HEENT: mucous membranes moist  Respiratory:  normal effort, no wheezes appreciated  CV: appears well perfused  Abdomen: Soft, non tender, non-distended. Groin incision clean and dry with some prominent induration/scar tissue beneath incision  Skin: warm and dry  Extremities: atraumatic  Neuro: no focal deficits noted  Psych: mood normal            ASSESSMENT:  80 y.o. male s/p left inguinal hernia repair. Doing well. .    1. Postoperative examination        PLAN:  Continue lifting restrictions for 4 more weeks  Call if problems arise      Kadie Youssef MD  2/21/2023  3:34 PM

## 2023-12-01 ENCOUNTER — ANESTHESIA EVENT (OUTPATIENT)
Dept: OPERATING ROOM | Age: 83
End: 2023-12-01
Payer: MEDICARE

## 2023-12-04 ENCOUNTER — ANESTHESIA (OUTPATIENT)
Dept: OPERATING ROOM | Age: 83
End: 2023-12-04
Payer: MEDICARE

## 2023-12-04 ENCOUNTER — HOSPITAL ENCOUNTER (OUTPATIENT)
Age: 83
Setting detail: OUTPATIENT SURGERY
Discharge: HOME | End: 2023-12-04
Attending: OPHTHALMOLOGY | Admitting: OPHTHALMOLOGY
Payer: MEDICARE

## 2023-12-04 VITALS
TEMPERATURE: 98 F | RESPIRATION RATE: 16 BRPM | SYSTOLIC BLOOD PRESSURE: 137 MMHG | OXYGEN SATURATION: 98 % | HEART RATE: 64 BPM | DIASTOLIC BLOOD PRESSURE: 87 MMHG

## 2023-12-04 PROBLEM — H25.11 AGE-RELATED NUCLEAR CATARACT OF RIGHT EYE: Status: ACTIVE | Noted: 2023-12-04

## 2023-12-04 PROCEDURE — 3700000001 HC ADD 15 MINUTES (ANESTHESIA): Performed by: OPHTHALMOLOGY

## 2023-12-04 PROCEDURE — 6370000000 HC RX 637 (ALT 250 FOR IP): Performed by: OPHTHALMOLOGY

## 2023-12-04 PROCEDURE — 6360000002 HC RX W HCPCS: Performed by: OPHTHALMOLOGY

## 2023-12-04 PROCEDURE — 2500000003 HC RX 250 WO HCPCS: Performed by: OPHTHALMOLOGY

## 2023-12-04 PROCEDURE — 6360000002 HC RX W HCPCS: Performed by: NURSE ANESTHETIST, CERTIFIED REGISTERED

## 2023-12-04 PROCEDURE — 2500000003 HC RX 250 WO HCPCS: Performed by: NURSE ANESTHETIST, CERTIFIED REGISTERED

## 2023-12-04 PROCEDURE — 3600000014 HC SURGERY LEVEL 4 ADDTL 15MIN: Performed by: OPHTHALMOLOGY

## 2023-12-04 PROCEDURE — 3600000004 HC SURGERY LEVEL 4 BASE: Performed by: OPHTHALMOLOGY

## 2023-12-04 PROCEDURE — 2709999900 HC NON-CHARGEABLE SUPPLY: Performed by: OPHTHALMOLOGY

## 2023-12-04 PROCEDURE — 2580000003 HC RX 258: Performed by: OPHTHALMOLOGY

## 2023-12-04 PROCEDURE — 7100000010 HC PHASE II RECOVERY - FIRST 15 MIN: Performed by: OPHTHALMOLOGY

## 2023-12-04 PROCEDURE — 7100000011 HC PHASE II RECOVERY - ADDTL 15 MIN: Performed by: OPHTHALMOLOGY

## 2023-12-04 PROCEDURE — V2632 POST CHMBR INTRAOCULAR LENS: HCPCS | Performed by: OPHTHALMOLOGY

## 2023-12-04 PROCEDURE — 3700000000 HC ANESTHESIA ATTENDED CARE: Performed by: OPHTHALMOLOGY

## 2023-12-04 DEVICE — AKREOS AO MICRO INCISION LENS +0.00D
Type: IMPLANTABLE DEVICE | Site: EYE | Status: FUNCTIONAL
Brand: AKREOS® AO IOL

## 2023-12-04 RX ORDER — NEOMYCIN SULFATE, POLYMYXIN B SULFATE, AND DEXAMETHASONE 3.5; 10000; 1 MG/G; [USP'U]/G; MG/G
OINTMENT OPHTHALMIC
Status: COMPLETED | OUTPATIENT
Start: 2023-12-04 | End: 2023-12-04

## 2023-12-04 RX ORDER — TETRACAINE HYDROCHLORIDE 5 MG/ML
SOLUTION OPHTHALMIC
Status: COMPLETED | OUTPATIENT
Start: 2023-12-04 | End: 2023-12-04

## 2023-12-04 RX ORDER — TROPICAMIDE 10 MG/ML
1 SOLUTION/ DROPS OPHTHALMIC SEE ADMIN INSTRUCTIONS
Status: DISCONTINUED | OUTPATIENT
Start: 2023-12-04 | End: 2023-12-04 | Stop reason: HOSPADM

## 2023-12-04 RX ORDER — CEFAZOLIN SODIUM 1 G/3ML
INJECTION, POWDER, FOR SOLUTION INTRAMUSCULAR; INTRAVENOUS
Status: COMPLETED | OUTPATIENT
Start: 2023-12-04 | End: 2023-12-04

## 2023-12-04 RX ORDER — LIDOCAINE HYDROCHLORIDE 20 MG/ML
INJECTION, SOLUTION EPIDURAL; INFILTRATION; INTRACAUDAL; PERINEURAL
Status: COMPLETED | OUTPATIENT
Start: 2023-12-04 | End: 2023-12-04

## 2023-12-04 RX ORDER — PROPOFOL 10 MG/ML
INJECTION, EMULSION INTRAVENOUS PRN
Status: DISCONTINUED | OUTPATIENT
Start: 2023-12-04 | End: 2023-12-04 | Stop reason: SDUPTHER

## 2023-12-04 RX ORDER — CYCLOPENTOLATE HYDROCHLORIDE 10 MG/ML
1 SOLUTION/ DROPS OPHTHALMIC SEE ADMIN INSTRUCTIONS
Status: DISCONTINUED | OUTPATIENT
Start: 2023-12-04 | End: 2023-12-04 | Stop reason: HOSPADM

## 2023-12-04 RX ORDER — MOXIFLOXACIN 5 MG/ML
SOLUTION/ DROPS OPHTHALMIC
Status: COMPLETED | OUTPATIENT
Start: 2023-12-04 | End: 2023-12-04

## 2023-12-04 RX ORDER — BETAMETHASONE SODIUM PHOSPHATE AND BETAMETHASONE ACETATE 3; 3 MG/ML; MG/ML
INJECTION, SUSPENSION INTRA-ARTICULAR; INTRALESIONAL; INTRAMUSCULAR; SOFT TISSUE
Status: COMPLETED | OUTPATIENT
Start: 2023-12-04 | End: 2023-12-04

## 2023-12-04 RX ORDER — SODIUM CHLORIDE 0.9 % (FLUSH) 0.9 %
5-40 SYRINGE (ML) INJECTION ONCE
Status: DISCONTINUED | OUTPATIENT
Start: 2023-12-04 | End: 2023-12-04 | Stop reason: HOSPADM

## 2023-12-04 RX ORDER — PHENYLEPHRINE HYDROCHLORIDE 25 MG/ML
1 SOLUTION/ DROPS OPHTHALMIC SEE ADMIN INSTRUCTIONS
Status: DISCONTINUED | OUTPATIENT
Start: 2023-12-04 | End: 2023-12-04 | Stop reason: HOSPADM

## 2023-12-04 RX ORDER — LIDOCAINE HYDROCHLORIDE 20 MG/ML
INJECTION, SOLUTION EPIDURAL; INFILTRATION; INTRACAUDAL; PERINEURAL PRN
Status: DISCONTINUED | OUTPATIENT
Start: 2023-12-04 | End: 2023-12-04 | Stop reason: SDUPTHER

## 2023-12-04 RX ORDER — SODIUM CHLORIDE, SODIUM LACTATE, POTASSIUM CHLORIDE, CALCIUM CHLORIDE 600; 310; 30; 20 MG/100ML; MG/100ML; MG/100ML; MG/100ML
INJECTION, SOLUTION INTRAVENOUS CONTINUOUS
Status: DISCONTINUED | OUTPATIENT
Start: 2023-12-04 | End: 2023-12-04 | Stop reason: HOSPADM

## 2023-12-04 RX ADMIN — PROPOFOL 40 MG: 10 INJECTION, EMULSION INTRAVENOUS at 08:27

## 2023-12-04 RX ADMIN — TROPICAMIDE 1 DROP: 10 SOLUTION/ DROPS OPHTHALMIC at 06:37

## 2023-12-04 RX ADMIN — TROPICAMIDE 1 DROP: 10 SOLUTION/ DROPS OPHTHALMIC at 06:49

## 2023-12-04 RX ADMIN — CYCLOPENTOLATE HYDROCHLORIDE 1 DROP: 10 SOLUTION OPHTHALMIC at 06:37

## 2023-12-04 RX ADMIN — PHENYLEPHRINE HYDROCHLORIDE 1 DROP: 25 SOLUTION/ DROPS OPHTHALMIC at 07:13

## 2023-12-04 RX ADMIN — SODIUM CHLORIDE, POTASSIUM CHLORIDE, SODIUM LACTATE AND CALCIUM CHLORIDE: 600; 310; 30; 20 INJECTION, SOLUTION INTRAVENOUS at 06:54

## 2023-12-04 RX ADMIN — CYCLOPENTOLATE HYDROCHLORIDE 1 DROP: 10 SOLUTION OPHTHALMIC at 07:13

## 2023-12-04 RX ADMIN — CYCLOPENTOLATE HYDROCHLORIDE 1 DROP: 10 SOLUTION OPHTHALMIC at 06:49

## 2023-12-04 RX ADMIN — PHENYLEPHRINE HYDROCHLORIDE 1 DROP: 25 SOLUTION/ DROPS OPHTHALMIC at 06:49

## 2023-12-04 RX ADMIN — PHENYLEPHRINE HYDROCHLORIDE 1 DROP: 25 SOLUTION/ DROPS OPHTHALMIC at 06:37

## 2023-12-04 RX ADMIN — LIDOCAINE HYDROCHLORIDE 100 MG: 20 INJECTION, SOLUTION EPIDURAL; INFILTRATION; INTRACAUDAL; PERINEURAL at 08:27

## 2023-12-04 RX ADMIN — TROPICAMIDE 1 DROP: 10 SOLUTION/ DROPS OPHTHALMIC at 07:13

## 2023-12-04 ASSESSMENT — PAIN - FUNCTIONAL ASSESSMENT: PAIN_FUNCTIONAL_ASSESSMENT: 0-10

## 2023-12-04 ASSESSMENT — PAIN SCALES - GENERAL
PAINLEVEL_OUTOF10: 0
PAINLEVEL_OUTOF10: 0

## 2023-12-04 NOTE — PROGRESS NOTES
Pt. [facility] via cart from the OR. Brakes applied, side rails up x 2. On room air. Denies pain or nausea. Patch/shield over right eye. IV infusing without difficulty. Bedside report received from Maryan Rome and Fab Wang CRNA. Drink given per pt. Request.  Call light in reach. Will continue to monitor.

## 2023-12-04 NOTE — DISCHARGE INSTRUCTIONS
Leave your eye patch/shield on until your follow up appointment with Dr. Vivi Whitt. Avoid any activity that will cause you to strain. No lifting, pushing or pulling greater than 10 lbs until ok'ed by Dr. Vivi Whitt. Call his office with any questions. [facility] in [address] (Same Day Surgery)    Do not drive, work around [address] or use equipment. Do not drink any alcoholic beverages. Do not smoke while alone. Avoid making important decisions. Plan to spend a quiet, relaxed evening @ home. Resume normal activities as you begin to feel better. Eat lightly for your first meal, then gradually increase your diet to what is normal for you. In case of nausea, avoid food and drink only clear liquids. Resume food as nausea ceases. Notify your surgeon if you experience fever, chills, large amount of bleeding, difficulty breathing, persistent nausea and vomiting or any other disturbing problem. Call for a follow-up appointment with your surgeon. Cataract Surgery: What to Expect at [address] [place] [address] had cataract surgery. It replaced your cloudy natural lens with a clear artificial one. After surgery, your eye may feel scratchy, sticky, or uncomfortable. It may also water more than usual.  Most people see better 1 to 3 days after surgery. But it could take 3 to 10 weeks to get the full benefits of surgery and to see as clearly as possible. Your doctor may send you home with a bandage, patch, or clear shield on your eye. This will keep you from rubbing your eye. Your doctor will also give you eyedrops to help your eye heal. Use them exactly as directed. You can read or watch TV right away, but things may look blurry. Most people are able to return to work or their normal routine in 1 to 3 days. After your eye heals, you may still need to wear glasses, especially for reading. This care sheet gives you a general idea about how long it will take for you to recover.  But each

## 2023-12-04 NOTE — PROGRESS NOTES
Pt. Awake, alert, and oriented x 4. On room air, vs stable. Pt. Denies pain or nausea. Patch/shield over right eye. Pt. Ready to get dressed and be discharged home.

## 2023-12-04 NOTE — ANESTHESIA POSTPROCEDURE EVALUATION
Department of Anesthesiology  Postprocedure Note    Patient: Meli Collado  MRN: [id number]  YOB: 1940  Date of evaluation: 12/4/2023      Procedure Summary     Date: 12/04/23 Room / Location: [address] [place] [address]    Anesthesia Start: [facility] Anesthesia Stop: 3116    Procedure: RIGHT EYE CATARACT EXTRACTION WITH INTRAOCULAR LENS IMPLANT (Right: Eye) Diagnosis:       Combined forms of age-related cataract of right eye      (Combined forms of age-related cataract of right eye [H25.811])    Surgeons: Kleber Martinez MD Responsible Provider: THIERNO Kwok CRNA    Anesthesia Type: MAC ASA Status: 2          Anesthesia Type: No value filed.     Cassia Phase I:      Cassia Phase II:        Anesthesia Post Evaluation    Patient location during evaluation: bedside  Patient participation: complete - patient participated  Level of consciousness: awake and alert  Pain score: 0  Airway patency: patent  Nausea & Vomiting: no vomiting and no nausea  Complications: no  Cardiovascular status: blood pressure returned to baseline and hemodynamically stable  Respiratory status: acceptable, room air, spontaneous ventilation and nonlabored ventilation  Hydration status: stable  Pain management: adequate

## 2023-12-04 NOTE — ADDENDUM NOTE
Addendum  created 12/04/23 1013 by THIERNO Atwood CRNA    Intraprocedure Meds edited, Orders acknowledged in Narrator

## 2024-01-31 NOTE — PROGRESS NOTES
.Surgery @ [facility] on 2/5/24  at 0900 arrival 0700    NOTHING TO EAT OR DRINK AFTER MIDNIGHT DAY OF SURGERY    1. Enter thru the hospital main entrance on day of surgery, check in at the Registration Desk. If you arrive prior to 6:00am, enter thru the ER entrance.    2. Follow the directions as prescribed by the doctor for your procedure and medications.         Morning of surgery take: Losartan with sips of water          Stop vitamins, supplements and NSAIDS:  today     3. Check with your Doctor regarding stopping blood thinners and follow their instructions.    4. Do not smoke, vape or use chewing tobacco morning of surgery. Do not drink any alcoholic beverages 24 hours prior to surgery.       This includes NA Beer. No street drugs 7 days prior to surgery.    5. Wash your face and do not put any make-up, oil or lotion on     6. Please bring picture ID, insurance card, paperwork from the doctor’s office (H & P, Consent, & card for implantable devices).    7. You will need a responsible adult to drive you home.

## 2024-02-02 ENCOUNTER — ANESTHESIA EVENT (OUTPATIENT)
Dept: OPERATING ROOM | Age: 84
End: 2024-02-02
Payer: MEDICARE

## 2024-02-05 ENCOUNTER — HOSPITAL ENCOUNTER (OUTPATIENT)
Age: 84
Setting detail: OUTPATIENT SURGERY
Discharge: HOME | End: 2024-02-05
Attending: OPHTHALMOLOGY | Admitting: OPHTHALMOLOGY
Payer: MEDICARE

## 2024-02-05 ENCOUNTER — ANESTHESIA (OUTPATIENT)
Dept: OPERATING ROOM | Age: 84
End: 2024-02-05
Payer: MEDICARE

## 2024-02-05 VITALS
OXYGEN SATURATION: 100 % | HEART RATE: 69 BPM | DIASTOLIC BLOOD PRESSURE: 76 MMHG | WEIGHT: 160 LBS | HEIGHT: 71 IN | TEMPERATURE: 98.9 F | SYSTOLIC BLOOD PRESSURE: 136 MMHG | BODY MASS INDEX: 22.4 KG/M2 | RESPIRATION RATE: 16 BRPM

## 2024-02-05 PROBLEM — H25.12 AGE-RELATED NUCLEAR CATARACT OF LEFT EYE: Status: ACTIVE | Noted: 2024-02-05

## 2024-02-05 PROCEDURE — 7100000010 HC PHASE II RECOVERY - FIRST 15 MIN: Performed by: OPHTHALMOLOGY

## 2024-02-05 PROCEDURE — 2709999900 HC NON-CHARGEABLE SUPPLY: Performed by: OPHTHALMOLOGY

## 2024-02-05 PROCEDURE — 3700000000 HC ANESTHESIA ATTENDED CARE: Performed by: OPHTHALMOLOGY

## 2024-02-05 PROCEDURE — 2500000003 HC RX 250 WO HCPCS: Performed by: OPHTHALMOLOGY

## 2024-02-05 PROCEDURE — 7100000011 HC PHASE II RECOVERY - ADDTL 15 MIN: Performed by: OPHTHALMOLOGY

## 2024-02-05 PROCEDURE — V2632 POST CHMBR INTRAOCULAR LENS: HCPCS | Performed by: OPHTHALMOLOGY

## 2024-02-05 PROCEDURE — 6370000000 HC RX 637 (ALT 250 FOR IP): Performed by: OPHTHALMOLOGY

## 2024-02-05 PROCEDURE — 3700000001 HC ADD 15 MINUTES (ANESTHESIA): Performed by: OPHTHALMOLOGY

## 2024-02-05 PROCEDURE — 2580000003 HC RX 258: Performed by: ANESTHESIOLOGY

## 2024-02-05 PROCEDURE — 2500000003 HC RX 250 WO HCPCS: Performed by: NURSE ANESTHETIST, CERTIFIED REGISTERED

## 2024-02-05 PROCEDURE — 3600000014 HC SURGERY LEVEL 4 ADDTL 15MIN: Performed by: OPHTHALMOLOGY

## 2024-02-05 PROCEDURE — 3600000004 HC SURGERY LEVEL 4 BASE: Performed by: OPHTHALMOLOGY

## 2024-02-05 PROCEDURE — 6360000002 HC RX W HCPCS: Performed by: NURSE ANESTHETIST, CERTIFIED REGISTERED

## 2024-02-05 PROCEDURE — 6360000002 HC RX W HCPCS: Performed by: OPHTHALMOLOGY

## 2024-02-05 DEVICE — AKREOS AO MICRO INCISION LENS +0.00D
Type: IMPLANTABLE DEVICE | Site: EYE | Status: FUNCTIONAL
Brand: AKREOS® AO IOL

## 2024-02-05 RX ORDER — TETRACAINE HYDROCHLORIDE 5 MG/ML
SOLUTION OPHTHALMIC
Status: COMPLETED | OUTPATIENT
Start: 2024-02-05 | End: 2024-02-05

## 2024-02-05 RX ORDER — SODIUM CHLORIDE, SODIUM LACTATE, POTASSIUM CHLORIDE, CALCIUM CHLORIDE 600; 310; 30; 20 MG/100ML; MG/100ML; MG/100ML; MG/100ML
1000 INJECTION, SOLUTION INTRAVENOUS CONTINUOUS
Status: DISCONTINUED | OUTPATIENT
Start: 2024-02-05 | End: 2024-02-05 | Stop reason: HOSPADM

## 2024-02-05 RX ORDER — CEFAZOLIN SODIUM 1 G/3ML
INJECTION, POWDER, FOR SOLUTION INTRAMUSCULAR; INTRAVENOUS
Status: COMPLETED | OUTPATIENT
Start: 2024-02-05 | End: 2024-02-05

## 2024-02-05 RX ORDER — PROPOFOL 10 MG/ML
INJECTION, EMULSION INTRAVENOUS PRN
Status: DISCONTINUED | OUTPATIENT
Start: 2024-02-05 | End: 2024-02-05 | Stop reason: SDUPTHER

## 2024-02-05 RX ORDER — TROPICAMIDE 10 MG/ML
1 SOLUTION/ DROPS OPHTHALMIC SEE ADMIN INSTRUCTIONS
Status: DISCONTINUED | OUTPATIENT
Start: 2024-02-05 | End: 2024-02-05 | Stop reason: HOSPADM

## 2024-02-05 RX ORDER — LIDOCAINE HYDROCHLORIDE 20 MG/ML
INJECTION, SOLUTION EPIDURAL; INFILTRATION; INTRACAUDAL; PERINEURAL PRN
Status: DISCONTINUED | OUTPATIENT
Start: 2024-02-05 | End: 2024-02-05 | Stop reason: SDUPTHER

## 2024-02-05 RX ORDER — BETAMETHASONE SODIUM PHOSPHATE AND BETAMETHASONE ACETATE 3; 3 MG/ML; MG/ML
INJECTION, SUSPENSION INTRA-ARTICULAR; INTRALESIONAL; INTRAMUSCULAR; SOFT TISSUE
Status: COMPLETED | OUTPATIENT
Start: 2024-02-05 | End: 2024-02-05

## 2024-02-05 RX ORDER — CYCLOPENTOLATE HYDROCHLORIDE 10 MG/ML
1 SOLUTION/ DROPS OPHTHALMIC SEE ADMIN INSTRUCTIONS
Status: DISCONTINUED | OUTPATIENT
Start: 2024-02-05 | End: 2024-02-05 | Stop reason: HOSPADM

## 2024-02-05 RX ORDER — LIDOCAINE HYDROCHLORIDE 20 MG/ML
INJECTION, SOLUTION EPIDURAL; INFILTRATION; INTRACAUDAL; PERINEURAL
Status: COMPLETED | OUTPATIENT
Start: 2024-02-05 | End: 2024-02-05

## 2024-02-05 RX ORDER — PHENYLEPHRINE HYDROCHLORIDE 25 MG/ML
1 SOLUTION/ DROPS OPHTHALMIC SEE ADMIN INSTRUCTIONS
Status: DISCONTINUED | OUTPATIENT
Start: 2024-02-05 | End: 2024-02-05 | Stop reason: HOSPADM

## 2024-02-05 RX ORDER — NEOMYCIN SULFATE, POLYMYXIN B SULFATE, AND DEXAMETHASONE 3.5; 10000; 1 MG/G; [USP'U]/G; MG/G
OINTMENT OPHTHALMIC
Status: COMPLETED | OUTPATIENT
Start: 2024-02-05 | End: 2024-02-05

## 2024-02-05 RX ORDER — MOXIFLOXACIN 5 MG/ML
SOLUTION/ DROPS OPHTHALMIC
Status: COMPLETED | OUTPATIENT
Start: 2024-02-05 | End: 2024-02-05

## 2024-02-05 RX ADMIN — LIDOCAINE HYDROCHLORIDE 100 MG: 20 INJECTION, SOLUTION EPIDURAL; INFILTRATION; INTRACAUDAL; PERINEURAL at 10:06

## 2024-02-05 RX ADMIN — SODIUM CHLORIDE, POTASSIUM CHLORIDE, SODIUM LACTATE AND CALCIUM CHLORIDE 1000 ML: 600; 310; 30; 20 INJECTION, SOLUTION INTRAVENOUS at 07:33

## 2024-02-05 RX ADMIN — PHENYLEPHRINE HYDROCHLORIDE 1 DROP: 25 SOLUTION/ DROPS OPHTHALMIC at 08:21

## 2024-02-05 RX ADMIN — CYCLOPENTOLATE HYDROCHLORIDE 1 DROP: 10 SOLUTION OPHTHALMIC at 07:57

## 2024-02-05 RX ADMIN — PHENYLEPHRINE HYDROCHLORIDE 1 DROP: 25 SOLUTION/ DROPS OPHTHALMIC at 07:29

## 2024-02-05 RX ADMIN — TROPICAMIDE 1 DROP: 10 SOLUTION/ DROPS OPHTHALMIC at 08:21

## 2024-02-05 RX ADMIN — PHENYLEPHRINE HYDROCHLORIDE 1 DROP: 25 SOLUTION/ DROPS OPHTHALMIC at 07:57

## 2024-02-05 RX ADMIN — CYCLOPENTOLATE HYDROCHLORIDE 1 DROP: 10 SOLUTION OPHTHALMIC at 07:29

## 2024-02-05 RX ADMIN — TROPICAMIDE 1 DROP: 10 SOLUTION/ DROPS OPHTHALMIC at 07:57

## 2024-02-05 RX ADMIN — CYCLOPENTOLATE HYDROCHLORIDE 1 DROP: 10 SOLUTION OPHTHALMIC at 08:21

## 2024-02-05 RX ADMIN — PROPOFOL 40 MG: 10 INJECTION, EMULSION INTRAVENOUS at 10:06

## 2024-02-05 RX ADMIN — TROPICAMIDE 1 DROP: 10 SOLUTION/ DROPS OPHTHALMIC at 07:29

## 2024-02-05 ASSESSMENT — PAIN SCALES - GENERAL: PAINLEVEL_OUTOF10: 0

## 2024-02-05 ASSESSMENT — PAIN - FUNCTIONAL ASSESSMENT
PAIN_FUNCTIONAL_ASSESSMENT: 0-10
PAIN_FUNCTIONAL_ASSESSMENT: 0-10

## 2024-02-05 NOTE — OP NOTE
ACCOUNT NUMBER:  PATIENT NAME: Deon Sue  SURGEON:Wilmer Woods MD   [address] [place] [address]    OPERATIVE REPORT          DATE OF OPERATION: _2/5/2024_  PREOPERTIVE DIAGNOIS:  CataractOS eye.  POSTOPERTIVE DIAGNOSIS:  Cataract_OS eye.  PROCEDURE:  Phacoemulsification of Cataract with intraocular lens      implant _OS eye.  ANESTHESIA:  Monitored anesthesia care with 2% Xylocaine in a       retrobullar injection.    DESCRIPTION OF PROCEDURE:  Under adequate local anesthesia, a lid speculum was placed in the eye to expose the globe.  Conjunctival flap was developed from 10 to 12 o’clock position.  Hemostasis was obtained using wet-field cautery.  A 69-Bever blade was than utilized to make the initial scleral incision to a mid depth.  Using a 55/22 blade a shelved incision was made into the anterior chamber.  Paracentesis site was made at the 2 o’clock position with a 75-Bever blade.  Healon was instilled and anterior capsuiorrhexis was performed.  Hydrodilssection with BSS was carried out.  Utilizing the phaco-emulsifier, the phacoemulsification of the cataract was accomplished.  Using the irrigation-aspiration unit the remaining cortical material was removed.  The intraocular lens was inspected and inserted into the bag with no difficulty after healon was instilled in the anterior chamber.  The healon was removed with irrigation-aspiration unit and the wound was inspected to be self-sealing.  The conjunctiva flap was then reapproximated with cautery.  Sub-Tenon injection of 0.5 ml of Celestone and 0.5 ml Ancef was given.  NeoDecadron was placed in the cul-de-sac.  The patient’s eye was patched and shielded. The patient was returned to recovery in satisfactory condition with no complications from surgery or anesthesia.    The patient was discharged on the same day with home-going instructions and no bending, lifting, or straining, The patient is to keep the patch on at all times and follow up in my

## 2024-02-05 NOTE — PROGRESS NOTES
Patient returned to room from OR. Bed brakes applied and VS obtained. See flow sheet. Patient A/O x4. Drink and snack offered. Dressing to left eye dry/intact with no drainage. Rachel, sister in law,at bedside. Will continue to monitor. Call light in reach.

## 2024-02-05 NOTE — PROGRESS NOTES
Patient remains A/O x4. VS stable. Dressing to left eye remains D/I. Patient drank ice water without c/o nausea or vomiting. Patient ready for discharge home. Verbal and written discharge instructions reviewed with patient and sister in law. They received copies after voicing understanding to all.  Patient instructed to call MD's office with any questions that arise. Patient voices understanding.

## 2024-02-05 NOTE — ANESTHESIA PRE PROCEDURE
Department of Anesthesiology  Preprocedure Note       Name:  Deon Sue   Age:  83 y.o.  :  1940                                          MRN:  [id number]         Date:  2024      Surgeon: Surgeon(s):  Wilmer Woods MD    Procedure: Procedure(s):  LEFT EYE CATARACT EXTRACTION    Medications prior to admission:   Prior to Admission medications    Medication Sig Start Date End Date Taking? Authorizing Provider   vitamin B-12 (CYANOCOBALAMIN) 100 MCG tablet Take 0.5 tablets by mouth daily    Jorge Coyle MD   losartan (COZAAR) 50 MG tablet Take 1 tablet by mouth daily 22   Jorge Coyle MD   lovastatin (MEVACOR) 40 MG tablet Take 1 tablet by mouth nightly    Jorge Coyle MD   fluticasone (FLONASE) 50 MCG/ACT nasal spray 1 spray by Nasal route daily    Jorge Coyle MD   aspirin 81 MG tablet Take 1 tablet by mouth daily    Jorge Coyle MD       Current medications:    No current facility-administered medications for this encounter.     Current Outpatient Medications   Medication Sig Dispense Refill   • vitamin B-12 (CYANOCOBALAMIN) 100 MCG tablet Take 0.5 tablets by mouth daily     • losartan (COZAAR) 50 MG tablet Take 1 tablet by mouth daily     • lovastatin (MEVACOR) 40 MG tablet Take 1 tablet by mouth nightly     • fluticasone (FLONASE) 50 MCG/ACT nasal spray 1 spray by Nasal route daily     • aspirin 81 MG tablet Take 1 tablet by mouth daily         Allergies:    Allergies   Allergen Reactions   • Sulfa Antibiotics Swelling and Rash       Problem List:    Patient Active Problem List   Diagnosis Code   • Bright red rectal bleeding K62.5   • Diverticulosis K57.90   • Acute cholecystitis K81.0   • Cholelithiasis K80.20   • Epigastric abdominal pain R10.13   • HLD (hyperlipidemia) E78.5   • Essential hypertension I10   • Age-related nuclear cataract of right eye H25.11       Past Medical History:        Diagnosis Date   • Diverticulitis    • Hyperlipidemia 
12/18/2021 06:21 AM       CMP:   Lab Results   Component Value Date/Time     12/18/2021 06:21 AM    K 4.3 12/18/2021 06:21 AM     12/18/2021 06:21 AM    CO2 24 12/18/2021 06:21 AM    BUN 16 12/18/2021 06:21 AM    CREATININE 1.2 12/18/2021 06:21 AM    GFRAA >60 12/18/2021 06:21 AM    LABGLOM 58 12/18/2021 06:21 AM    GLUCOSE 97 12/18/2021 06:21 AM    PROT 5.8 12/18/2021 06:21 AM    CALCIUM 8.6 12/18/2021 06:21 AM    BILITOT 0.9 12/18/2021 06:21 AM    ALKPHOS 114 12/18/2021 06:21 AM     12/18/2021 06:21 AM     12/18/2021 06:21 AM       POC Tests: No results for input(s): \"POCGLU\", \"POCNA\", \"POCK\", \"POCCL\", \"POCBUN\", \"POCHEMO\", \"POCHCT\" in the last 72 hours.    Coags:   Lab Results   Component Value Date/Time    PROTIME 13.5 12/17/2021 08:00 AM    INR 1.09 12/17/2021 08:00 AM    APTT 28.6 08/10/2013 09:45 AM       HCG (If Applicable): No results found for: \"PREGTESTUR\", \"PREGSERUM\", \"HCG\", \"HCGQUANT\"     ABGs: No results found for: \"PHART\", \"PO2ART\", \"HOC2KSU\", \"OYB8RED\", \"BEART\", \"P8FTAPRI\"     Type & Screen (If Applicable):  No results found for: \"LABABO\", \"LABRH\"    Drug/Infectious Status (If Applicable):  Lab Results   Component Value Date/Time    HEPCAB NON REACTIVE 12/16/2021 10:45 PM       COVID-19 Screening (If Applicable):   Lab Results   Component Value Date/Time    COVID19 NOT DETECTED 12/17/2021 01:00 AM           Anesthesia Evaluation  Patient summary reviewed  Airway: Mallampati: II  TM distance: >3 FB   Neck ROM: full  Mouth opening: > = 3 FB   Dental:          Pulmonary:Negative Pulmonary ROS and normal exam                               Cardiovascular:  Exercise tolerance: good (>4 METS),   (+) hypertension:, hyperlipidemia        Rhythm: regular  Rate: normal           Beta Blocker:  Not on Beta Blocker         Neuro/Psych:   Negative Neuro/Psych ROS              GI/Hepatic/Renal: Neg GI/Hepatic/Renal ROS            Endo/Other: Negative Endo/Other ROS

## 2024-02-05 NOTE — DISCHARGE INSTRUCTIONS
Leave your eye patch/shield on until your follow up appointment with Dr. Woods tomorrow. Avoid any activity that will cause you to strain. No lifting, pushing or pulling greater than 10 lbs until ok'ed by Dr. Woods. Call his office with any questions.           Cataract Surgery: What to Expect at Home  Your Recovery     You had cataract surgery. It replaced your cloudy natural lens with a clear artificial one.  After surgery, your eye may feel scratchy, sticky, or uncomfortable. It may also water more than usual.  Most people see better 1 to 3 days after surgery. But it could take 3 to 10 weeks to get the full benefits of surgery and to see as clearly as possible.  Your doctor may send you home with a bandage, patch, or clear shield on your eye. This will keep you from rubbing your eye. Your doctor will also give you eyedrops to help your eye heal. Use them exactly as directed.  You can read or watch TV right away, but things may look blurry. Most people are able to return to work or their normal routine in 1 to 3 days. After your eye heals, you may still need to wear glasses, especially for reading.  This care sheet gives you a general idea about how long it will take for you to recover. But each person recovers at a different pace. Follow the steps below to get better as quickly as possible.  How can you care for yourself at home?  Activity    Rest when you feel tired. Getting enough sleep will help you recover.     You may have trouble judging distances for a few days. Move slowly, and be careful going up and down stairs and pouring hot liquids. Ask for help if you need it.     Ask your doctor when it is okay to drive.     Wear your eye bandage, patch, or shield for as long as your doctor recommends. You may only need to wear it when you sleep.     You can shower or wash your hair the day after surgery. Keep water, soap, shampoo, hair spray, and shaving lotion out of your eye, especially for the first week.     Do

## 2024-02-05 NOTE — ANESTHESIA POSTPROCEDURE EVALUATION
Department of Anesthesiology  Postprocedure Note    Patient: Deon Sue  MRN: [id number]  YOB: 1940  Date of evaluation: 2/5/2024    Procedure Summary       Date: 02/05/24 Room / Location: [address]    Anesthesia Start: 1002 Anesthesia Stop: 1027    Procedure: LEFT EYE CATARACT EXTRACTION WITH INTRAOCULAR LENS IMPLANT (Left: Eye) Diagnosis:       Combined forms of age-related cataract of left eye      (Combined forms of age-related cataract of left eye [H25.812])    Surgeons: Wilmer Woods MD Responsible Provider: Rell Rogers APRN - CRNA    Anesthesia Type: MAC ASA Status: 2            Anesthesia Type: No value filed.    Cassia Phase I:      Cassia Phase II:      Anesthesia Post Evaluation    Patient location during evaluation: bedside  Patient participation: complete - patient participated  Level of consciousness: awake and alert  Pain score: 0  Airway patency: patent  Nausea & Vomiting: no vomiting and no nausea  Cardiovascular status: blood pressure returned to baseline and hemodynamically stable  Respiratory status: acceptable, room air, spontaneous ventilation and nonlabored ventilation  Hydration status: stable  Pain management: adequate    No notable events documented.

## (undated) DEVICE — BAG SPEC REM 224ML W4XL6IN DIA10MM 1 HND GYN DISP ENDOPCH

## (undated) DEVICE — MICROSURGICAL INSTRUMENT RETROBULAR NEEDLE 25GA X 38MM (1 1/2 IN): Brand: ALCON

## (undated) DEVICE — BLADE OPHTH 180DEG CUT SURF BLU STR SHRP DBL BVL GRINDLESS

## (undated) DEVICE — APPLICATOR MEDICATED 26 CC SOLUTION HI LT ORNG CHLORAPREP

## (undated) DEVICE — SUTURE PROL SZ 2-0 L30IN NONABSORBABLE BLU L26MM SH 1/2 CIR 8833H

## (undated) DEVICE — SYRINGE 20ML LL S/C 50

## (undated) DEVICE — Z DISCONTINUED USE 2741852 LINE SAMP O2 6.5FT W/FEMALE CONN F/ADULT CAPNOLINE PLUS

## (undated) DEVICE — GLOVE ORANGE PI 7   MSG9070

## (undated) DEVICE — GAUZE,SPONGE,4"X4",16PLY,XRAY,STRL,LF: Brand: MEDLINE

## (undated) DEVICE — GLOVE SURG SZ 65 THK91MIL LTX FREE SYN POLYISOPRENE

## (undated) DEVICE — GOWN,SIRUS,POLYRNF,BRTHSLV,XLN/XL,20/CS: Brand: MEDLINE

## (undated) DEVICE — MARKER SURG SKIN UTIL REGULAR/FINE 2 TIP W/ RUL AND 9 LBL

## (undated) DEVICE — SET ADMIN L105IN 10GTT 3 NDL FREE CK VLV 2 PC M LUERLOCK

## (undated) DEVICE — ELECTRODE ES AD CRDLSS PT RET REM POLYHESIVE

## (undated) DEVICE — GLOVE ORANGE PI 7 1/2   MSG9075

## (undated) DEVICE — HYPODERMIC SAFETY NEEDLE: Brand: MAGELLAN

## (undated) DEVICE — TOWEL,OR,DSP,ST,BLUE,STD,6/PK,12PK/CS: Brand: MEDLINE

## (undated) DEVICE — SUTURE COAT VCRL SZ 4-0 L18IN ABSRB UD L19MM PS-2 1/2 CIR J496G

## (undated) DEVICE — COUNTER NDL 30 COUNT FOAM STRP SGL MAG

## (undated) DEVICE — SOLUTION IV IRRIG WATER 1000ML POUR BRL 2F7114

## (undated) DEVICE — ELECTRODE,RADIOTRANSLUCENT,FOAM,5PK: Brand: MEDLINE

## (undated) DEVICE — DRAIN SURG W0.5XL18IN FLAT GRAV FOR OPN WND PENROSE

## (undated) DEVICE — PACK SURG LAP CHOLE

## (undated) DEVICE — SUTURE MCRYL SZ 4-0 L18IN ABSRB UD L19MM PS-2 3/8 CIR PRIM Y496G

## (undated) DEVICE — SET EXTN L41IN 2 NDL FREE VALVES FREE INJ PRT

## (undated) DEVICE — SOLUTION IV 1000ML 0.9% SOD CHL FOR IRRIG PLAS CONT

## (undated) DEVICE — TUBING, SUCTION, 1/4" X 10', STRAIGHT: Brand: MEDLINE

## (undated) DEVICE — NEEDLE HYPO 23GA L1.5IN TURQ POLYPR HUB S STL THN WALL IM

## (undated) DEVICE — INTENDED FOR TISSUE SEPARATION, AND OTHER PROCEDURES THAT REQUIRE A SHARP SURGICAL BLADE TO PUNCTURE OR CUT.: Brand: BARD-PARKER ® STAINLESS STEEL BLADES

## (undated) DEVICE — PENCIL ES CRD L10FT HND SWCHING ROCK SWCH W/ EDGE COAT BLDE

## (undated) DEVICE — NEEDLE HYPO 20GA L1.5IN YEL POLYPR HUB S STL REG BVL STR

## (undated) DEVICE — TROCAR: Brand: KII FIOS FIRST ENTRY

## (undated) DEVICE — Device

## (undated) DEVICE — TUBING INSUFFLATOR HEAT HI FLO SET PNEUMOCLEAR

## (undated) DEVICE — SOLUTION IRRIG 250ML STRL H2O PLAS POUR BTL USP

## (undated) DEVICE — NEEDLE INSUF L120MM DIA2MM DISP FOR PNEUMOPERI ENDOPATH

## (undated) DEVICE — Z INACTIVE USE 2735373 APPLICATOR FBR LAIN COT WOOD TIP ECONOMICAL

## (undated) DEVICE — SUTURE SZ 0 27IN 5/8 CIR UR-6  TAPER PT VIOLET ABSRB VICRYL J603H

## (undated) DEVICE — LINER,SEMI-RIGID,3000CC,50EA/CS: Brand: MEDLINE

## (undated) DEVICE — PACK,BASIC,IX: Brand: MEDLINE

## (undated) DEVICE — DRAPE SHEET ULTRAGARD: Brand: MEDLINE

## (undated) DEVICE — SYRINGE MED 20ML STD CLR PLAS LUERLOCK TIP N CTRL DISP

## (undated) DEVICE — TROCAR: Brand: KII® SLEEVE

## (undated) DEVICE — YANKAUER,FLEXIBLE HANDLE,REGLR CAPACITY: Brand: MEDLINE INDUSTRIES, INC.

## (undated) DEVICE — SUTURE VCRL SZ 3-0 L27IN ABSRB UD L26MM SH 1/2 CIR J416H

## (undated) DEVICE — CLIP INT L POLYMER LOK LIG HEM O LOK

## (undated) DEVICE — BLADE CLIPPER GEN PURP NS

## (undated) DEVICE — ADHESIVE SKIN CLSR 0.7ML TOP DERMBND ADV

## (undated) DEVICE — SUTURE VCRL SZ 2-0 L27IN ABSRB UD L26MM CT-2 1/2 CIR J269H

## (undated) DEVICE — GLOVE SURG SZ 7 L12IN FNGR THK79MIL GRN LTX FREE

## (undated) DEVICE — SET TBNG DISP TIP FOR AHTO